# Patient Record
Sex: FEMALE | Race: WHITE | Employment: OTHER
[De-identification: names, ages, dates, MRNs, and addresses within clinical notes are randomized per-mention and may not be internally consistent; named-entity substitution may affect disease eponyms.]

---

## 2018-03-08 ENCOUNTER — RX ONLY (RX ONLY)
Age: 58
End: 2018-03-08

## 2018-03-08 ENCOUNTER — DOCTOR'S OFFICE (OUTPATIENT)
Dept: URBAN - METROPOLITAN AREA CLINIC 137 | Facility: CLINIC | Age: 58
Setting detail: OPHTHALMOLOGY
End: 2018-03-08
Payer: COMMERCIAL

## 2018-03-08 DIAGNOSIS — H52.4: ICD-10-CM

## 2018-03-08 PROBLEM — H40.013 GLAUCOMA SUSPECT OPEN ANGLE; BOTH EYES: Status: ACTIVE | Noted: 2018-03-08

## 2018-03-08 PROBLEM — H25.13 CATARACT NUCLEAR SCLEROSIS; BOTH EYES: Status: ACTIVE | Noted: 2018-03-08

## 2018-03-08 PROBLEM — H04.123 DRY EYE; BOTH EYES: Status: ACTIVE | Noted: 2018-03-08

## 2018-03-08 PROCEDURE — 92004 COMPRE OPH EXAM NEW PT 1/>: CPT | Performed by: OPHTHALMOLOGY

## 2018-03-08 ASSESSMENT — REFRACTION_MANIFEST
OS_VA1: 20/
OD_VA1: 20/
OD_VA1: 20/
OU_VA: 20/
OS_VA2: 20/
OU_VA: 20/
OD_VA2: 20/
OS_VA2: 20/
OD_VA3: 20/
OS_VA1: 20/
OD_VA3: 20/
OS_VA3: 20/
OS_VA3: 20/
OD_VA2: 20/

## 2018-03-08 ASSESSMENT — REFRACTION_AUTOREFRACTION
OD_AXIS: 008
OS_CYLINDER: +0.50
OD_CYLINDER: +1.75
OD_SPHERE: -0.50
OS_AXIS: 125
OS_SPHERE: +2.00

## 2018-03-08 ASSESSMENT — REFRACTION_CURRENTRX
OS_CYLINDER: +0.50
OD_ADD: +2.00
OS_OVR_VA: 20/
OS_OVR_VA: 20/
OD_SPHERE: -0.50
OD_OVR_VA: 20/
OS_AXIS: 166
OS_VPRISM_DIRECTION: PROGS
OS_ADD: +2.00
OD_VPRISM_DIRECTION: PROGS
OD_AXIS: 008
OS_SPHERE: +1.50
OD_CYLINDER: +1.00
OS_OVR_VA: 20/
OD_OVR_VA: 20/
OD_OVR_VA: 20/

## 2018-03-08 ASSESSMENT — SPHEQUIV_DERIVED
OD_SPHEQUIV: 0.375
OS_SPHEQUIV: 2.25

## 2018-03-08 ASSESSMENT — REFRACTION_OUTSIDERX
OS_ADD: +2.00
OS_CYLINDER: +0.50
OS_VA2: 20/20(J1+)
OS_SPHERE: +1.75
OD_VA1: 20/20-1
OS_AXIS: 170
OD_VA2: 20/20(J1+)
OS_VA1: 20/25
OD_AXIS: 010
OD_VA3: 20/
OD_ADD: +2.00
OS_VA3: 20/
OU_VA: 20/20
OD_CYLINDER: +1.25
OD_SPHERE: -0.50

## 2018-03-08 ASSESSMENT — CONFRONTATIONAL VISUAL FIELD TEST (CVF)
OD_FINDINGS: FULL
OS_FINDINGS: FULL

## 2018-03-08 ASSESSMENT — VISUAL ACUITY
OD_BCVA: 20/25-1
OS_BCVA: 20/25-2

## 2018-03-20 ENCOUNTER — DOCTOR'S OFFICE (OUTPATIENT)
Dept: URBAN - METROPOLITAN AREA CLINIC 137 | Facility: CLINIC | Age: 58
Setting detail: OPHTHALMOLOGY
End: 2018-03-20
Payer: MEDICARE

## 2018-03-20 DIAGNOSIS — H40.013: ICD-10-CM

## 2018-03-20 PROCEDURE — 92083 EXTENDED VISUAL FIELD XM: CPT | Performed by: OPHTHALMOLOGY

## 2018-04-04 ENCOUNTER — APPOINTMENT (EMERGENCY)
Dept: CT IMAGING | Facility: HOSPITAL | Age: 58
DRG: 918 | End: 2018-04-04
Payer: COMMERCIAL

## 2018-04-04 ENCOUNTER — APPOINTMENT (EMERGENCY)
Dept: RADIOLOGY | Facility: HOSPITAL | Age: 58
DRG: 918 | End: 2018-04-04
Payer: COMMERCIAL

## 2018-04-04 ENCOUNTER — HOSPITAL ENCOUNTER (INPATIENT)
Facility: HOSPITAL | Age: 58
LOS: 2 days | Discharge: HOME HEALTH | DRG: 918 | End: 2018-04-06
Attending: EMERGENCY MEDICINE | Admitting: FAMILY MEDICINE
Payer: COMMERCIAL

## 2018-04-04 DIAGNOSIS — R11.10 VOMITING: ICD-10-CM

## 2018-04-04 DIAGNOSIS — F32.A DEPRESSION: ICD-10-CM

## 2018-04-04 DIAGNOSIS — Q79.60 EHLERS-DANLOS DISEASE: ICD-10-CM

## 2018-04-04 DIAGNOSIS — T50.901A: Primary | ICD-10-CM

## 2018-04-04 DIAGNOSIS — R47.81 SLURRED SPEECH: ICD-10-CM

## 2018-04-04 PROBLEM — R29.810 FACIAL DROOP: Status: ACTIVE | Noted: 2018-04-04

## 2018-04-04 PROBLEM — R73.03 PREDIABETES: Status: ACTIVE | Noted: 2018-04-04

## 2018-04-04 PROBLEM — I10 HYPERTENSION: Status: ACTIVE | Noted: 2018-04-04

## 2018-04-04 PROBLEM — E78.5 HYPERLIPIDEMIA: Status: ACTIVE | Noted: 2018-04-04

## 2018-04-04 LAB
ALBUMIN SERPL BCP-MCNC: 4 G/DL (ref 3.5–5)
ALP SERPL-CCNC: 98 U/L (ref 46–116)
ALT SERPL W P-5'-P-CCNC: 29 U/L (ref 12–78)
AMPHETAMINES SERPL QL SCN: NEGATIVE
ANION GAP SERPL CALCULATED.3IONS-SCNC: 8 MMOL/L (ref 4–13)
APAP SERPL-MCNC: <2 UG/ML (ref 10–30)
APTT PPP: 31 SECONDS (ref 23–35)
AST SERPL W P-5'-P-CCNC: 16 U/L (ref 5–45)
BACTERIA UR QL AUTO: ABNORMAL /HPF
BARBITURATES UR QL: NEGATIVE
BASOPHILS # BLD AUTO: 0.03 THOUSANDS/ΜL (ref 0–0.1)
BASOPHILS NFR BLD AUTO: 0 % (ref 0–1)
BENZODIAZ UR QL: NEGATIVE
BILIRUB SERPL-MCNC: 0.4 MG/DL (ref 0.2–1)
BILIRUB UR QL STRIP: NEGATIVE
BUN SERPL-MCNC: 20 MG/DL (ref 5–25)
CALCIUM SERPL-MCNC: 8.9 MG/DL (ref 8.3–10.1)
CHLORIDE SERPL-SCNC: 102 MMOL/L (ref 100–108)
CLARITY UR: CLEAR
CO2 SERPL-SCNC: 30 MMOL/L (ref 21–32)
COCAINE UR QL: NEGATIVE
COLOR UR: YELLOW
CREAT SERPL-MCNC: 0.55 MG/DL (ref 0.6–1.3)
EOSINOPHIL # BLD AUTO: 0.01 THOUSAND/ΜL (ref 0–0.61)
EOSINOPHIL NFR BLD AUTO: 0 % (ref 0–6)
ERYTHROCYTE [DISTWIDTH] IN BLOOD BY AUTOMATED COUNT: 13.6 % (ref 11.6–15.1)
ETHANOL SERPL-MCNC: <3 MG/DL (ref 0–3)
GFR SERPL CREATININE-BSD FRML MDRD: 106 ML/MIN/1.73SQ M
GLUCOSE SERPL-MCNC: 152 MG/DL (ref 65–140)
GLUCOSE UR STRIP-MCNC: NEGATIVE MG/DL
HCT VFR BLD AUTO: 42.7 % (ref 34.8–46.1)
HGB BLD-MCNC: 14.2 G/DL (ref 11.5–15.4)
HGB UR QL STRIP.AUTO: ABNORMAL
INR PPP: 0.84 (ref 0.86–1.16)
KETONES UR STRIP-MCNC: ABNORMAL MG/DL
LEUKOCYTE ESTERASE UR QL STRIP: NEGATIVE
LIPASE SERPL-CCNC: 131 U/L (ref 73–393)
LYMPHOCYTES # BLD AUTO: 1.34 THOUSANDS/ΜL (ref 0.6–4.47)
LYMPHOCYTES NFR BLD AUTO: 12 % (ref 14–44)
MCH RBC QN AUTO: 30.9 PG (ref 26.8–34.3)
MCHC RBC AUTO-ENTMCNC: 33.3 G/DL (ref 31.4–37.4)
MCV RBC AUTO: 93 FL (ref 82–98)
METHADONE UR QL: NEGATIVE
MONOCYTES # BLD AUTO: 0.47 THOUSAND/ΜL (ref 0.17–1.22)
MONOCYTES NFR BLD AUTO: 4 % (ref 4–12)
NEUTROPHILS # BLD AUTO: 9.49 THOUSANDS/ΜL (ref 1.85–7.62)
NEUTS SEG NFR BLD AUTO: 84 % (ref 43–75)
NITRITE UR QL STRIP: NEGATIVE
NON-SQ EPI CELLS URNS QL MICRO: ABNORMAL /HPF
OPIATES UR QL SCN: NEGATIVE
PCP UR QL: NEGATIVE
PH UR STRIP.AUTO: 5.5 [PH] (ref 4.5–8)
PLATELET # BLD AUTO: 250 THOUSANDS/UL (ref 149–390)
PMV BLD AUTO: 10.1 FL (ref 8.9–12.7)
POTASSIUM SERPL-SCNC: 4 MMOL/L (ref 3.5–5.3)
PROT SERPL-MCNC: 7.6 G/DL (ref 6.4–8.2)
PROT UR STRIP-MCNC: NEGATIVE MG/DL
PROTHROMBIN TIME: 11.8 SECONDS (ref 12.1–14.4)
RBC # BLD AUTO: 4.59 MILLION/UL (ref 3.81–5.12)
RBC #/AREA URNS AUTO: ABNORMAL /HPF
SALICYLATES SERPL-MCNC: <3 MG/DL (ref 3–20)
SODIUM SERPL-SCNC: 140 MMOL/L (ref 136–145)
SP GR UR STRIP.AUTO: >=1.03 (ref 1–1.03)
THC UR QL: NEGATIVE
URATE CRY URNS QL MICRO: ABNORMAL /HPF
UROBILINOGEN UR QL STRIP.AUTO: 0.2 E.U./DL
WBC # BLD AUTO: 11.34 THOUSAND/UL (ref 4.31–10.16)
WBC #/AREA URNS AUTO: ABNORMAL /HPF

## 2018-04-04 PROCEDURE — 80307 DRUG TEST PRSMV CHEM ANLYZR: CPT | Performed by: EMERGENCY MEDICINE

## 2018-04-04 PROCEDURE — 99285 EMERGENCY DEPT VISIT HI MDM: CPT

## 2018-04-04 PROCEDURE — 93005 ELECTROCARDIOGRAM TRACING: CPT

## 2018-04-04 PROCEDURE — G0480 DRUG TEST DEF 1-7 CLASSES: HCPCS | Performed by: EMERGENCY MEDICINE

## 2018-04-04 PROCEDURE — 85730 THROMBOPLASTIN TIME PARTIAL: CPT | Performed by: EMERGENCY MEDICINE

## 2018-04-04 PROCEDURE — 99222 1ST HOSP IP/OBS MODERATE 55: CPT | Performed by: FAMILY MEDICINE

## 2018-04-04 PROCEDURE — 83690 ASSAY OF LIPASE: CPT | Performed by: EMERGENCY MEDICINE

## 2018-04-04 PROCEDURE — 96374 THER/PROPH/DIAG INJ IV PUSH: CPT

## 2018-04-04 PROCEDURE — 81001 URINALYSIS AUTO W/SCOPE: CPT

## 2018-04-04 PROCEDURE — 36415 COLL VENOUS BLD VENIPUNCTURE: CPT | Performed by: EMERGENCY MEDICINE

## 2018-04-04 PROCEDURE — 80329 ANALGESICS NON-OPIOID 1 OR 2: CPT | Performed by: EMERGENCY MEDICINE

## 2018-04-04 PROCEDURE — 71046 X-RAY EXAM CHEST 2 VIEWS: CPT

## 2018-04-04 PROCEDURE — 85025 COMPLETE CBC W/AUTO DIFF WBC: CPT | Performed by: EMERGENCY MEDICINE

## 2018-04-04 PROCEDURE — 70450 CT HEAD/BRAIN W/O DYE: CPT

## 2018-04-04 PROCEDURE — 80320 DRUG SCREEN QUANTALCOHOLS: CPT | Performed by: EMERGENCY MEDICINE

## 2018-04-04 PROCEDURE — 80053 COMPREHEN METABOLIC PANEL: CPT | Performed by: EMERGENCY MEDICINE

## 2018-04-04 PROCEDURE — 85610 PROTHROMBIN TIME: CPT | Performed by: EMERGENCY MEDICINE

## 2018-04-04 PROCEDURE — 96361 HYDRATE IV INFUSION ADD-ON: CPT

## 2018-04-04 RX ORDER — SODIUM CHLORIDE 9 MG/ML
125 INJECTION, SOLUTION INTRAVENOUS CONTINUOUS
Status: DISCONTINUED | OUTPATIENT
Start: 2018-04-04 | End: 2018-04-06 | Stop reason: HOSPADM

## 2018-04-04 RX ORDER — DULOXETIN HYDROCHLORIDE 60 MG/1
20 CAPSULE, DELAYED RELEASE ORAL DAILY
COMMUNITY
End: 2018-04-06 | Stop reason: HOSPADM

## 2018-04-04 RX ORDER — OXCARBAZEPINE 150 MG/1
150 TABLET, FILM COATED ORAL 2 TIMES DAILY
COMMUNITY

## 2018-04-04 RX ORDER — NYSTATIN 100000 [USP'U]/G
POWDER TOPICAL 2 TIMES DAILY
COMMUNITY
Start: 2018-01-04

## 2018-04-04 RX ORDER — MAGNESIUM HYDROXIDE/ALUMINUM HYDROXICE/SIMETHICONE 120; 1200; 1200 MG/30ML; MG/30ML; MG/30ML
15 SUSPENSION ORAL EVERY 6 HOURS PRN
Status: DISCONTINUED | OUTPATIENT
Start: 2018-04-04 | End: 2018-04-06 | Stop reason: HOSPADM

## 2018-04-04 RX ORDER — ONDANSETRON 2 MG/ML
4 INJECTION INTRAMUSCULAR; INTRAVENOUS ONCE
Status: COMPLETED | OUTPATIENT
Start: 2018-04-04 | End: 2018-04-04

## 2018-04-04 RX ORDER — CEPHALEXIN 250 MG/1
500 CAPSULE ORAL EVERY 8 HOURS SCHEDULED
Status: ON HOLD | COMMUNITY
End: 2018-06-30

## 2018-04-04 RX ORDER — PRAVASTATIN SODIUM 40 MG
40 TABLET ORAL EVERY OTHER DAY
COMMUNITY

## 2018-04-04 RX ORDER — ONDANSETRON 2 MG/ML
4 INJECTION INTRAMUSCULAR; INTRAVENOUS ONCE
Status: DISCONTINUED | OUTPATIENT
Start: 2018-04-04 | End: 2018-04-06 | Stop reason: HOSPADM

## 2018-04-04 RX ORDER — ONDANSETRON 2 MG/ML
4 INJECTION INTRAMUSCULAR; INTRAVENOUS EVERY 6 HOURS PRN
Status: DISCONTINUED | OUTPATIENT
Start: 2018-04-04 | End: 2018-04-06 | Stop reason: HOSPADM

## 2018-04-04 RX ORDER — POLYETHYLENE GLYCOL 3350 17 G/17G
17 POWDER, FOR SOLUTION ORAL DAILY PRN
Status: DISCONTINUED | OUTPATIENT
Start: 2018-04-04 | End: 2018-04-06 | Stop reason: HOSPADM

## 2018-04-04 RX ORDER — PRAVASTATIN SODIUM 40 MG
40 TABLET ORAL EVERY OTHER DAY
Status: DISCONTINUED | OUTPATIENT
Start: 2018-04-05 | End: 2018-04-06 | Stop reason: HOSPADM

## 2018-04-04 RX ORDER — OXCARBAZEPINE 150 MG/1
150 TABLET, FILM COATED ORAL 2 TIMES DAILY
Status: DISCONTINUED | OUTPATIENT
Start: 2018-04-05 | End: 2018-04-06 | Stop reason: HOSPADM

## 2018-04-04 RX ORDER — ACETAMINOPHEN 325 MG/1
650 TABLET ORAL EVERY 6 HOURS PRN
Status: DISCONTINUED | OUTPATIENT
Start: 2018-04-04 | End: 2018-04-06 | Stop reason: HOSPADM

## 2018-04-04 RX ORDER — DULOXETIN HYDROCHLORIDE 20 MG/1
20 CAPSULE, DELAYED RELEASE ORAL DAILY
Status: DISCONTINUED | OUTPATIENT
Start: 2018-04-05 | End: 2018-04-06 | Stop reason: HOSPADM

## 2018-04-04 RX ORDER — NYSTATIN 100000 [USP'U]/G
POWDER TOPICAL 2 TIMES DAILY
Status: DISCONTINUED | OUTPATIENT
Start: 2018-04-05 | End: 2018-04-06 | Stop reason: HOSPADM

## 2018-04-04 RX ADMIN — ONDANSETRON 4 MG: 2 INJECTION INTRAMUSCULAR; INTRAVENOUS at 19:52

## 2018-04-04 RX ADMIN — SODIUM CHLORIDE 1000 ML: 0.9 INJECTION, SOLUTION INTRAVENOUS at 19:42

## 2018-04-04 RX ADMIN — SODIUM CHLORIDE 1000 ML: 0.9 INJECTION, SOLUTION INTRAVENOUS at 23:07

## 2018-04-04 NOTE — ED PROVIDER NOTES
History  Chief Complaint   Patient presents with    CVA/TIA-like Symptoms     from home via EMS with CVA symptoms     72-year-old female presents to the emergency department via EMS from home  EMS provides history that her  had spoken with her by phone at 12:30  Her speech was clear at that time and she was complaining of a severe headache which was not typical for her  He came home this evening and found her with slurred speech and left-sided weakness  EMS appreciated weakness on left hand    reported the patient has a number of physical illness at baseline which would prevent her from following some commands  Glucose was within normal limits  Blood pressure was 90/60  Oxygen saturation was 91% on room air and improved upon application of 2 L nasal cannula  Patient denies having any pain at this time  Denies having any shortness of breath  Prior records briefly reviewed  Patient with Hereditary and idiopathic peripheral neuropathy, prediabetes, hypertension, Guillermo-Danlos and constipation  Spoke with  the at 25 39  He relates that he had spoken with his wife at about 15 31  She indicated that she had bad headache and was going to lay on the couch  Her speech sounded clear  She had additionally mentioned having received the result of a CBC which were unremarkable  He provides history that approximately 10 months ago she was hospitalized with anemia  She had was additionally found to have a urinary tract infection at that time which was felt to have contributed to the anemia  He notes that a small amount of blood wears in her urine then  During hospitalization she had delirium of unclear cause  He notes that she was put on a number of psychiatric medications though has been off of these now for several months  He shares past medical history of neuropathy and 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    She additionally has a history of spinal stenosis and underwent surgery for this greater than a year ago  She does not have a history of headaches and has not had any TIAs/CVAs  He has not appreciated her with any slurred speech previously  Daughter notes that over the past few days she has mention feeling tired  She does have insomnia and has not been sleeping well   brought bottles of medications including cephalexin which was prescribed   He was uncertain as to the indication for this medication or whether she was actively taking it  To of 21 tablets remained  He notes that the patient's mother had a stroke and a problem with her carotid artery  Details of this are unknown  Patient sees multiple providers at [facility]             Prior to Admission Medications   Prescriptions Last Dose Informant Patient Reported? Taking? DULoxetine (CYMBALTA) 60 mg delayed release capsule   Yes Yes   Sig: Take 20 mg by mouth daily   OXcarbazepine (TRILEPTAL) 150 mg tablet   Yes Yes   Sig: Take 150 mg by mouth 2 (two) times a day   Plecanatide 3 MG TABS   Yes Yes   Sig: Take 3 mg by mouth daily   cephalexin (KEFLEX) 250 mg capsule   Yes Yes   Sig: Take 500 mg by mouth every 8 (eight) hours   nystatin (MYCOSTATIN) powder   Yes Yes   Sig: Apply topically 2 (two) times a day   pravastatin (PRAVACHOL) 40 mg tablet   Yes Yes   Sig: Take 40 mg by mouth every other day      Facility-Administered Medications: None       History reviewed  No pertinent past medical history  Past Surgical History:   Procedure Laterality Date     SECTION      x 2    X-STOP IMPLANTATION      2017       History reviewed  No pertinent family history  I have reviewed and agree with the history as documented      Social History   Substance Use Topics    Smoking status: Former Smoker    Smokeless tobacco: Never Used    Alcohol use Yes      Comment: social        Review of Systems   All other systems reviewed and are negative  Physical Exam  ED Triage Vitals [04/04/18 1924]   Temperature Pulse Respirations Blood Pressure SpO2   98 2 °F (36 8 °C) 93 20 138/84 98 %      Temp Source Heart Rate Source Patient Position - Orthostatic VS BP Location FiO2 (%)   Oral Monitor Sitting Left arm --      Pain Score       No Pain           Orthostatic Vital Signs  Vitals:    04/04/18 1924 04/04/18 2041 04/04/18 2137 04/04/18 2233   BP: 138/84 138/84 127/67 138/78   Pulse: 93 81 83 88   Patient Position - Orthostatic VS: Sitting Lying  Lying       Physical Exam   Constitutional: She appears well-developed and well-nourished  Initially anxious appearing  Subsequently vomited a large amount of yellow food content material   HENT:   Head: Normocephalic and atraumatic  Eyes: Conjunctivae and EOM are normal  Pupils are equal, round, and reactive to light  Scab appreciated over the center of the right upper eyelid   Cardiovascular: Normal rate and regular rhythm  Pulmonary/Chest: Effort normal and breath sounds normal  No respiratory distress  Abdominal: Soft  She exhibits no distension  There is no tenderness  Musculoskeletal:   Patient with lower legs in braces  Neurological: She is alert  No facial asymmetry appreciated on examination  Speech is a bit dysarthric  Pupils briskly reactive to light  EOMI  No nystagmus  Strong eye closure & symmetric brow raise  Ability to insufflate cheeks, protrude tongue midline & range this laterally  Symmetric/ intact sensation in the upper, mid & lower portions of the face  5/5 strength on head turn, shoulder shrug, bicep, tricep & deltoid movement against resistance b/l   5/5 strength on b/l hand   Raises hips reasonably well off stretcher keeping knees bent  Skin: Skin is warm and dry  Psychiatric: Her behavior is normal    Nursing note and vitals reviewed        ED Medications  Medications   ondansetron (ZOFRAN) injection 4 mg (0 mg Intravenous Hold 4/4/18 2130)   sodium chloride 0 9 % infusion (125 mL/hr Intravenous New Bag 4/5/18 0034)   DULoxetine (CYMBALTA) delayed release capsule 20 mg (not administered)   nystatin (MYCOSTATIN) powder (not administered)   OXcarbazepine (TRILEPTAL) tablet 150 mg (not administered)   Plecanatide TABS 3 mg (not administered)   pravastatin (PRAVACHOL) tablet 40 mg (not administered)   sodium chloride 0 9 % bolus 1,000 mL (1,000 mL Intravenous New Bag 4/4/18 2307)   polyethylene glycol (MIRALAX) packet 17 g (not administered)   enoxaparin (LOVENOX) subcutaneous injection 40 mg (not administered)   acetaminophen (TYLENOL) tablet 650 mg (not administered)   ondansetron (ZOFRAN) injection 4 mg (not administered)   aluminum-magnesium hydroxide-simethicone (MYLANTA) 200-200-20 mg/5 mL oral suspension 15 mL (not administered)   sodium chloride 0 9 % bolus 1,000 mL (0 mL Intravenous Stopped 4/4/18 2136)   ondansetron (ZOFRAN) injection 4 mg (4 mg Intravenous Given 4/4/18 1952)       Diagnostic Studies  Results Reviewed     Procedure Component Value Units Date/Time    Ethanol [[id number]]  (Normal) Collected:  04/04/18 2136    Lab Status:  Final result Specimen:  Blood from Arm, Left Updated:  04/04/18 2204     Ethanol Lvl <3 mg/dL     Salicylate level [[id number]]  (Abnormal) Collected:  04/04/18 2136    Lab Status:  Final result Specimen:  Blood from Arm, Left Updated:  45/88/81 4216     Salicylate Lvl <3 (L) mg/dL     Acetaminophen level [[id number]]  (Abnormal) Collected:  04/04/18 2136    Lab Status:  Final result Specimen:  Blood from Arm, Left Updated:  04/04/18 2159     Acetaminophen Level <2 (L) ug/mL     Urine Microscopic [[id number]]  (Abnormal) Collected:  04/04/18 2102    Lab Status:  Final result Specimen:  Urine Updated:  04/04/18 2154     RBC, UA 4-10 (A) /hpf      WBC, UA 0-5 /hpf      Epithelial Cells Occasional /hpf      Bacteria, UA None Seen /hpf      Uric Acid Jackeline, UA Moderate /hpf     Rapid drug screen, urine [[id number]]  (Normal) Collected: 04/04/18 2057    Lab Status:  Final result Specimen:  Urine from Urine, Other Updated:  04/04/18 2140     Amph/Meth UR Negative     Barbiturate Ur Negative     Benzodiazepine Urine Negative     Cocaine Urine Negative     Methadone Urine Negative     Opiate Urine Negative     PCP Ur Negative     THC Urine Negative    Narrative:         FOR MEDICAL PURPOSES ONLY  IF CONFIRMATION NEEDED PLEASE CONTACT THE LAB WITHIN 5 DAYS      Drug Screen Cutoff Levels:  AMPHETAMINE/METHAMPHETAMINES  1000 ng/mL  BARBITURATES     200 ng/mL  BENZODIAZEPINES     200 ng/mL  COCAINE      300 ng/mL  METHADONE      300 ng/mL  OPIATES      300 ng/mL  PHENCYCLIDINE     25 ng/mL  THC       50 ng/mL    ED Urine Macroscopic [[id number]]  (Abnormal) Collected:  04/04/18 2102    Lab Status:  Final result Specimen:  Urine Updated:  04/04/18 2102     Color, UA Yellow     Clarity, UA Clear     pH, UA 5 5     Leukocytes, UA Negative     Nitrite, UA Negative     Protein, UA Negative mg/dl      Glucose, UA Negative mg/dl      Ketones, UA Trace (A) mg/dl      Urobilinogen, UA 0 2 E U /dl      Bilirubin, UA Negative     Blood, UA Large (A)     Specific Gravity, UA >=1 030    Narrative:       CLINITEK RESULT    Comprehensive metabolic panel [[id number]]  (Abnormal) Collected:  04/04/18 1943    Lab Status:  Final result Specimen:  Blood from Arm, Left Updated:  04/04/18 2008     Sodium 140 mmol/L      Potassium 4 0 mmol/L      Chloride 102 mmol/L      CO2 30 mmol/L      Anion Gap 8 mmol/L      BUN 20 mg/dL      Creatinine 0 55 (L) mg/dL      Glucose 152 (H) mg/dL      Calcium 8 9 mg/dL      AST 16 U/L      ALT 29 U/L      Alkaline Phosphatase 98 U/L      Total Protein 7 6 g/dL      Albumin 4 0 g/dL      Total Bilirubin 0 40 mg/dL      eGFR 106 ml/min/1 73sq m     Narrative:         National Kidney Disease Education Program recommendations are as follows:  GFR calculation is accurate only with a steady state creatinine  Chronic Kidney disease less than 60 ml/min/1 73 sq  meters  Kidney failure less than 15 ml/min/1 73 sq  meters  Lipase [[id number]]  (Normal) Collected:  04/04/18 1943    Lab Status:  Final result Specimen:  Blood from Arm, Left Updated:  04/04/18 2008     Lipase 131 u/L     Protime-INR [[id number]]  (Abnormal) Collected:  04/04/18 1943    Lab Status:  Final result Specimen:  Blood from Arm, Left Updated:  04/04/18 2004     Protime 11 8 (L) seconds      INR 0 84 (L)    APTT [[id number]]  (Normal) Collected:  04/04/18 1943    Lab Status:  Final result Specimen:  Blood from Arm, Left Updated:  04/04/18 2004     PTT 31 seconds     Narrative: Therapeutic Heparin Range = 60-90 seconds    CBC and differential [[id number]]  (Abnormal) Collected:  04/04/18 1943    Lab Status:  Final result Specimen:  Blood from Arm, Left Updated:  04/04/18 1952     WBC 11 34 (H) Thousand/uL      RBC 4 59 Million/uL      Hemoglobin 14 2 g/dL      Hematocrit 42 7 %      MCV 93 fL      MCH 30 9 pg      MCHC 33 3 g/dL      RDW 13 6 %      MPV 10 1 fL      Platelets 390 Thousands/uL      Neutrophils Relative 84 (H) %      Lymphocytes Relative 12 (L) %      Monocytes Relative 4 %      Eosinophils Relative 0 %      Basophils Relative 0 %      Neutrophils Absolute 9 49 (H) Thousands/µL      Lymphocytes Absolute 1 34 Thousands/µL      Monocytes Absolute 0 47 Thousand/µL      Eosinophils Absolute 0 01 Thousand/µL      Basophils Absolute 0 03 Thousands/µL                  XR chest 2 views   Final Result by Miguel Palmer MD (04/04 2131)      No active pulmonary disease  Workstation performed: [id number]         CT head without contrast   Final Result by Cesar Munguia MD (04/04 2028)      No acute intracranial abnormality                    Workstation performed: [id number]                    Procedures  ECG 12 Lead Documentation  Date/Time: 4/4/2018 7:35 PM  Performed by: Karrie Collado  Authorized by: Karrie Collado     ECG reviewed by me, the ED Provider: yes    Patient location:  ED  Previous ECG:     Previous ECG:  Compared to current    Similarity:  No change  Interpretation:     Interpretation: normal    Rate:     ECG rate:  85    ECG rate assessment: normal    Rhythm:     Rhythm: sinus rhythm    Ectopy:     Ectopy: none    QRS:     QRS axis:  Left           Phone Contacts  ED Phone Contact    ED Course  ED Course as of Apr 05 0101 Wed Apr 04, 2018 2118 Patient shared with her  any passed along to nursing staff that she had taken extra of her oxcarbazepine today  Patient admits to having taken this at approximately 1300  She estimates that she took 3/4 of a bottle    And later related that she believe she took 60 tabs    She relates that there had been 90 in the bottle  When questioned what had been going through her mind when she took these she relates I was depressed    When asked what she was hoping the medication would do she relates ease my depression    I asked if she was trying to end her life in she stated I was depressed   Her  then began speaking up about how the Cymbalta was in fact for her depression and that this medication was for leg pain  She then began explaining that she was having a lot of leg pain  He stated you weren't trying to end your life  - you were just trying to help the leg pain   She agreed with this assessment and did not speak further on this  Both denied that she had ever over taken medication previously  Patient relates that she feels [place]    She does have very mild nausea  Abdomen soft and nontender                                  MDM  Number of Diagnoses or Management Options  Depression:   Overdose of medication:   Slurred speech:   Vomiting:     CritCare Time    Disposition  Final diagnoses:   Overdose of medication   Depression   Slurred speech   Vomiting     Time reflects when diagnosis was documented in both MDM as applicable and the Disposition within this note     Time User Action Codes Description Comment    4/4/2018  9:26 PM Viji RANDOLPH Add [T50 901A] Overdose of medication     4/4/2018  9:26 PM Viji RANDOLPH Add [F32 9] Depression     4/4/2018  9:26 PM Viji RANDOLPH Add [R47 81] Slurred speech     4/4/2018  9:26 PM Viji RANDOLPH Add [R11 10] Vomiting       ED Disposition     ED Disposition Condition Comment    Admit  Case was discussed with  ESCOBAR Rebolledo and the patient's admission status was agreed to be inpatient, telemetry to the service of Dr Radhames Ca   Follow-up Information    None       Current Discharge Medication List      CONTINUE these medications which have NOT CHANGED    Details   cephalexin (KEFLEX) 250 mg capsule Take 500 mg by mouth every 8 (eight) hours      DULoxetine (CYMBALTA) 60 mg delayed release capsule Take 20 mg by mouth daily      nystatin (MYCOSTATIN) powder Apply topically 2 (two) times a day      OXcarbazepine (TRILEPTAL) 150 mg tablet Take 150 mg by mouth 2 (two) times a day      Plecanatide 3 MG TABS Take 3 mg by mouth daily      pravastatin (PRAVACHOL) 40 mg tablet Take 40 mg by mouth every other day           No discharge procedures on file      ED Provider  Electronically Signed by           Adelina Ronquillo MD  04/05/18 6985

## 2018-04-05 LAB
ANION GAP SERPL CALCULATED.3IONS-SCNC: 9 MMOL/L (ref 4–13)
ARTERIAL PATENCY WRIST A: ABNORMAL
ATRIAL RATE: 89 BPM
BASE EXCESS BLDA CALC-SCNC: 2 MMOL/L (ref -2–3)
BUN SERPL-MCNC: 15 MG/DL (ref 5–25)
CA-I BLD-SCNC: 1.19 MMOL/L (ref 1.12–1.32)
CALCIUM SERPL-MCNC: 8.4 MG/DL (ref 8.3–10.1)
CHLORIDE SERPL-SCNC: 107 MMOL/L (ref 100–108)
CO2 SERPL-SCNC: 28 MMOL/L (ref 21–32)
CREAT SERPL-MCNC: 0.44 MG/DL (ref 0.6–1.3)
DS:DELIVERY SYSTEM: ABNORMAL
FIO2 GAS DIL.REBREATH: 28 L
GFR SERPL CREATININE-BSD FRML MDRD: 114 ML/MIN/1.73SQ M
GLUCOSE SERPL-MCNC: 112 MG/DL (ref 65–140)
GLUCOSE SERPL-MCNC: 166 MG/DL (ref 65–140)
HCO3 BLDA-SCNC: 27.2 MMOL/L (ref 22–28)
HCT VFR BLD CALC: 36 % (ref 34.8–46.1)
HGB BLDA-MCNC: 12.2 G/DL (ref 11.5–15.4)
P AXIS: 64 DEGREES
PCO2 BLD: 29 MMOL/L (ref 21–32)
PCO2 BLD: 46.1 MM HG (ref 36–44)
PH BLD: 7.38 [PH] (ref 7.35–7.45)
PO2 BLD: 98 MM HG (ref 75–129)
POTASSIUM BLD-SCNC: 3.7 MMOL/L (ref 3.5–5.3)
POTASSIUM SERPL-SCNC: 3.8 MMOL/L (ref 3.5–5.3)
PR INTERVAL: 156 MS
QRS AXIS: -39 DEGREES
QRSD INTERVAL: 100 MS
QT INTERVAL: 372 MS
QTC INTERVAL: 443 MS
SAMPLE SITE: ABNORMAL
SAO2 % BLD FROM PO2: 97 % (ref 95–98)
SODIUM BLD-SCNC: 140 MMOL/L (ref 136–145)
SODIUM SERPL-SCNC: 144 MMOL/L (ref 136–145)
SPECIMEN SOURCE: ABNORMAL
T WAVE AXIS: 46 DEGREES
VENTRICULAR RATE: 85 BPM

## 2018-04-05 PROCEDURE — 84132 ASSAY OF SERUM POTASSIUM: CPT

## 2018-04-05 PROCEDURE — 85014 HEMATOCRIT: CPT

## 2018-04-05 PROCEDURE — 92610 EVALUATE SWALLOWING FUNCTION: CPT

## 2018-04-05 PROCEDURE — 36600 WITHDRAWAL OF ARTERIAL BLOOD: CPT

## 2018-04-05 PROCEDURE — 80048 BASIC METABOLIC PNL TOTAL CA: CPT | Performed by: PHYSICIAN ASSISTANT

## 2018-04-05 PROCEDURE — 82947 ASSAY GLUCOSE BLOOD QUANT: CPT

## 2018-04-05 PROCEDURE — 99223 1ST HOSP IP/OBS HIGH 75: CPT | Performed by: PSYCHIATRY & NEUROLOGY

## 2018-04-05 PROCEDURE — 84295 ASSAY OF SERUM SODIUM: CPT

## 2018-04-05 PROCEDURE — 93010 ELECTROCARDIOGRAM REPORT: CPT | Performed by: INTERNAL MEDICINE

## 2018-04-05 PROCEDURE — 82803 BLOOD GASES ANY COMBINATION: CPT

## 2018-04-05 PROCEDURE — 82330 ASSAY OF CALCIUM: CPT

## 2018-04-05 PROCEDURE — 80183 DRUG SCRN QUANT OXCARBAZEPIN: CPT | Performed by: PHYSICIAN ASSISTANT

## 2018-04-05 RX ADMIN — SODIUM CHLORIDE 125 ML/HR: 0.9 INJECTION, SOLUTION INTRAVENOUS at 10:05

## 2018-04-05 RX ADMIN — NYSTATIN: 100000 POWDER TOPICAL at 10:06

## 2018-04-05 RX ADMIN — SODIUM CHLORIDE 125 ML/HR: 0.9 INJECTION, SOLUTION INTRAVENOUS at 00:34

## 2018-04-05 RX ADMIN — ENOXAPARIN SODIUM 40 MG: 40 INJECTION SUBCUTANEOUS at 10:11

## 2018-04-05 RX ADMIN — NYSTATIN: 100000 POWDER TOPICAL at 18:09

## 2018-04-05 RX ADMIN — SODIUM CHLORIDE 125 ML/HR: 0.9 INJECTION, SOLUTION INTRAVENOUS at 18:11

## 2018-04-05 NOTE — H&P
H&P- Coco Farrar 1960, 56 y o  female MRN: [id number]    Unit/Bed#: -01 Encounter: [id number]    Primary Care Provider: Maryclare Claude, DO   Date and time admitted to hospital: 4/4/2018  7:16 PM  * Overdose   Assessment & Plan    1  Pt took sixty 150 mg tablets of Trileptal   -currently denying SI, admits to depression   2  Vomiting and hypotensive prior to arrival    -resolving, most recent BP's in 890'J systolic   3  CT head negative for hemorrhage, CXR without signs of aspiration   4  Electrolytes wnl on arrival   5  Monitor on telemetry  6  Will check Trileptal level   7  Q4 Neuro checks   8  Supportive care with anti-emetics, IVF   -monitor BP, monitor electrolytes  9  Psych consult   -Pt will require 1:1        Guillermo-Danlos disease   Assessment & Plan    1  Will hold off on Trileptal for neuropathy for now, can continue tomorrow if pt is still alert and asymptomatic  2  Managed by PCP  3  Pt uses scooter and walker  Hyperlipidemia   Assessment & Plan    1  Continue statin  Prediabetes   Assessment & Plan    1  Carb-controlled diet  VTE Prophylaxis: Enoxaparin (Lovenox)  / sequential compression device   Code Status: FULL  POLST: POLST form is not discussed and not completed at this time  Discussion with family:  and two children at bedside    Anticipated Length of Stay:  Patient will be admitted on an Inpatient basis with an anticipated length of stay of  > 2 midnights  Justification for Hospital Stay:  Requiring telemetry monitoring and psych eval     Total Time for Visit, including Counseling / Coordination of Care: 30 minutes  Greater than 50% of this total time spent on direct patient counseling and coordination of care  Chief Complaint:   Altered mental status     History of Present Illness:    Coco Farrar is a 56 y o  female with PMHx of SUSAN Richardson, who presents with altered mental status and vomiting   Patient states that she was having really bad leg pain so she took about "three-quarters" of the bottle of her Trileptal meds for neuropathy  Pt says the bottle was not "full" however  She says usually there are 90 pills per bottle and she thinks maybe there were 85 pills total in the bottle form which she consumed 3/4ths  Pt says she does not remember much after that  She does remember vomiting  She never experienced shortness of breath or chest pain  She has minimal control of bowel and bladder function at baseline  She feels generally fatigued and weak but denies any focal weakness or focal numbness  She has baseline numbness of her lower extremities and says that this is unchanged  She denies change in vision and her only headache was a tension headache earlier in the day  Denies facial droop  Pt denies any hematuria or bloody or dark stools as pt had issues with anemia during last admission 2/2 hematuria  She denies abdominal pain or diarrhea   does think she may have coughed a couple times while vomiting  Asked family to step out as  would answer for  His wife often, specifically when I asked why she took so many pills  He insisted that she was just confused and must have been having very bad leg pain  Family was right outside the door when I asked again why she took so many pills  Pt still claimed that she just had worsening leg pain and that she thought this would help her legs  She denies any intention to hurt herself  She says that her Cymbalta is managing her depression well but does admit to feeling stressed at home  She says her disease can be difficult to manage and that she misses having her children at home who are not moved out of the house  Pt is agreeable to speaking with Psych  Review of Systems:    Review of Systems   Constitutional: Negative  HENT: Negative  Eyes: Negative  Respiratory: Negative  Cardiovascular: Negative  Gastrointestinal: Positive for vomiting   Negative for abdominal distention, abdominal pain, blood in stool and diarrhea  Endocrine: Negative  Genitourinary: Negative  Musculoskeletal: Positive for myalgias  Neurological: Positive for numbness and headaches  Negative for dizziness, tremors, seizures, syncope, facial asymmetry, speech difficulty, weakness and light-headedness  Hematological: Negative  Psychiatric/Behavioral: Negative  Past Medical and Surgical History:     History reviewed  No pertinent past medical history  Past Surgical History:   Procedure Laterality Date     SECTION      x 2    X-STOP IMPLANTATION      2017       Meds/Allergies:    Prior to Admission medications    Medication Sig Start Date End Date Taking? Authorizing Provider   cephalexin (KEFLEX) 250 mg capsule Take 500 mg by mouth every 8 (eight) hours   Yes Historical Provider, MD   DULoxetine (CYMBALTA) 60 mg delayed release capsule Take 20 mg by mouth daily   Yes Historical Provider, MD   nystatin (MYCOSTATIN) powder Apply topically 2 (two) times a day 18  Yes Historical Provider, MD   OXcarbazepine (TRILEPTAL) 150 mg tablet Take 150 mg by mouth 2 (two) times a day   Yes Historical Provider, MD   Plecanatide 3 MG TABS Take 3 mg by mouth daily 18  Yes Historical Provider, MD   pravastatin (PRAVACHOL) 40 mg tablet Take 40 mg by mouth every other day   Yes Historical Provider, MD     I have reviewed home medications with patient personally  Allergies:    Allergies   Allergen Reactions    Ciprofloxacin        Social History:     Marital Status: /Civil Union   Occupation: unemployed  Patient Pre-hospital Living Situation: home with   Patient Pre-hospital Level of Mobility: scooter mostly, walker occasionally  Patient Pre-hospital Diet Restrictions: none  Substance Use History:   History   Alcohol Use    Yes     Comment: social     History   Smoking Status    Former Smoker   Smokeless Tobacco    Never Used     History   Drug use: Unknown Family History:    non-contributory    Physical Exam:     Vitals:   Blood Pressure: 138/78 (04/04/18 2233)  Pulse: 88 (04/04/18 2233)  Temperature: 98 3 °F (36 8 °C) (04/04/18 2233)  Temp Source: Oral (04/04/18 2233)  Respirations: 16 (04/04/18 2233)  Height: 5' 6" (167 6 cm) (04/04/18 2233)  Weight - Scale: 95 4 kg (210 lb 5 1 oz) (04/04/18 2233)  SpO2: 93 % (04/04/18 2233)    Physical Exam    Additional Data:     Lab Results: I have personally reviewed pertinent reports  Results from last 7 days  Lab Units 04/04/18 1943   WBC Thousand/uL 11 34*   HEMOGLOBIN g/dL 14 2   HEMATOCRIT % 42 7   PLATELETS Thousands/uL 250   NEUTROS PCT % 84*   LYMPHS PCT % 12*   MONOS PCT % 4   EOS PCT % 0       Results from last 7 days  Lab Units 04/04/18 1943   SODIUM mmol/L 140   POTASSIUM mmol/L 4 0   CHLORIDE mmol/L 102   CO2 mmol/L 30   BUN mg/dL 20   CREATININE mg/dL 0 55*   CALCIUM mg/dL 8 9   TOTAL PROTEIN g/dL 7 6   BILIRUBIN TOTAL mg/dL 0 40   ALK PHOS U/L 98   ALT U/L 29   AST U/L 16   GLUCOSE RANDOM mg/dL 152*       Results from last 7 days  Lab Units 04/04/18 1943   INR  0 84*       Imaging: I have personally reviewed pertinent reports  XR chest 2 views   Final Result by Yani Choi MD (04/04 2131)      No active pulmonary disease  Workstation performed: [id number]         CT head without contrast   Final Result by Douglas John MD (04/04 2028)      No acute intracranial abnormality  Workstation performed: [id number]             EKG, Pathology, and Other Studies Reviewed on Admission:   · EKG: NSR, 85 BPM    Allscripts / Epic Records Reviewed: No     ** Please Note: This note has been constructed using a voice recognition system   **

## 2018-04-05 NOTE — ED NOTES
Pt states she took extra Trileptal today  Pt unsure how many pills  States she took them to relieve her pain, denies SI  Trileptal bottle filled 2/18/18  Pt states she takes 3 per day  19 pills left in bottle tonight  Pt first states she took 5 today, but then states she took 36  Pt  unsure        Mary Lou Rogers, ELOISA  04/04/18 1103
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
## 2018-04-05 NOTE — PROGRESS NOTES
Told by nurse that patient with change in mental status  Pt is with eyes closed and is tossing and turning in bed, will occasionally turn on her side and  the side rails  Pt will open her eyes to tactile stimuli but will not respond verbally to any questions  Pupils are reactive to light  CC AP available for discussion and evaluated patient  Pt did stick tongue when asked by CC AP and also squeezed hands bilaterally when advised  No focal deficits  Will check an ABG, continue neuro checks Q4  Suspect a sleep movement disorder however if patient is not alert by morning, will consult Neurology

## 2018-04-05 NOTE — CASE MANAGEMENT
Initial Clinical Review    Admission: Date/Time/Statement: 4/4/18 @ 2128     Orders Placed This Encounter   Procedures    Inpatient Admission (expected length of stay for this patient is greater than two midnights)     Standing Status:   Standing     Number of Occurrences:   1     Order Specific Question:   Admitting Physician     Answer:   Ludivina Duffy [[id number]]     Order Specific Question:   Level of Care     Answer:   Med Surg [16]     Order Specific Question:   Bed request comments     Answer:   Telemetry     Order Specific Question:   Estimated length of stay     Answer:   More than 2 Midnights     Order Specific Question:   Certification     Answer:   I certify that inpatient services are medically necessary for this patient for a duration of greater than two midnights  See H&P and MD Progress Notes for additional information about the patient's course of treatment  ED: Date/Time/Mode of Arrival:   ED Arrival Information     Expected Arrival Acuity Means of Arrival Escorted By Service Admission Type    - 4/4/2018 19:15 [facility]    Arrival Complaint    -          Chief Complaint:   Chief Complaint   Patient presents with    CVA/TIA-like Symptoms     from home via EMS with CVA symptoms       History of Illness: 56 y o  female with PMHx of SUSAN Oh, who presents with altered mental status and vomiting  Patient states that she was having really bad leg pain so she took about "three-quarters" of the bottle of her Trileptal meds for neuropathy  Pt says the bottle was not "full" however  She says usually there are 90 pills per bottle and she thinks maybe there were 85 pills total in the bottle form which she consumed 3/4ths  Pt says she does not remember much after that  She does remember vomiting  She never experienced shortness of breath or chest pain  She has minimal control of bowel and bladder function at baseline   She feels generally fatigued and weak but denies any focal weakness or focal numbness  She has baseline numbness of her lower extremities and says that this is unchanged  She denies change in vision and her only headache was a tension headache earlier in the day  Denies facial droop  Pt denies any hematuria or bloody or dark stools as pt had issues with anemia during last admission 2/2 hematuria  She denies abdominal pain or diarrhea   does think she may have coughed a couple times while vomiting       Asked family to step out as  would answer for  His wife often, specifically when I asked why she took so many pills  He insisted that she was just confused and must have been having very bad leg pain  Family was right outside the door when I asked again why she took so many pills  Pt still claimed that she just had worsening leg pain and that she thought this would help her legs  She denies any intention to hurt herself  She says that her Cymbalta is managing her depression well but does admit to feeling stressed at home  She says her disease can be difficult to manage and that she misses having her children at home who are not moved out of the house  Pt is agreeable to speaking with Psych    ED Vital Signs:   ED Triage Vitals [04/04/18 1924]   Temperature Pulse Respirations Blood Pressure SpO2   98 2 °F (36 8 °C) 93 20 138/84 98 %      Temp Source Heart Rate Source Patient Position - Orthostatic VS BP Location FiO2 (%)   Oral Monitor Sitting Left arm --      Pain Score       No Pain        Wt Readings from Last 1 Encounters:   04/04/18 95 4 kg (210 lb 5 1 oz)       Vital Signs (abnormal): none  Exam - anxious appearing  Vomited a large amount of yellow food content material   Scab over center of right upper eye lid  Lower legs in braces  Speech dysarthic,      Abnormal Labs/Diagnostic Test Results:   Bun 20  Creatinine 0 55  Glucose 152  INR 0 84  Wbc 11 34       Ct head - no acute intracranial abnormality    Labs 4/5 - bun 15  Creatinine 0 44  Blood gas 7 37/46 1/98/27 2/97%  UA trace ketones  Large blood  ED Treatment:   Medication Administration from 04/04/2018 1915 to 04/04/2018 2226       Date/Time Order Dose Route Action Action by Comments     04/04/2018 2136 sodium chloride 0 9 % bolus 1,000 mL 0 mL Intravenous Stopped Erica aH RN      04/04/2018 1942 sodium chloride 0 9 % bolus 1,000 mL 1,000 mL Intravenous New Bag Sylvan Peabody, RN      04/04/2018 1952 ondansetron (ZOFRAN) injection 4 mg 4 mg Intravenous Given Sylvan Peabody, RN      04/04/2018 2130 ondansetron (ZOFRAN) injection 4 mg 0 mg Intravenous Hold Erica Ha RN           Past Medical/Surgical History: Active Ambulatory Problems     Diagnosis Date Noted    No Active Ambulatory Problems     Resolved Ambulatory Problems     Diagnosis Date Noted    No Resolved Ambulatory Problems     No Additional Past Medical History       Admitting Diagnosis: Slurred speech [R47 81]  Vomiting [R11 10]  Depression [F32 9]  Facial droop [R29 810]  Overdose of medication [T50 901A]    Age/Sex: 56 y o  female    Assessment/Plan:   Overdose   Assessment & Plan     1  Pt took sixty 150 mg tablets of Trileptal               -currently denying SI, admits to depression   2  Vomiting and hypotensive prior to arrival                -resolving, most recent BP's in 075'J systolic   3  CT head negative for hemorrhage, CXR without signs of aspiration   4  Electrolytes wnl on arrival   5  Monitor on telemetry  6  Will check Trileptal level   7  Q4 Neuro checks   8  Supportive care with anti-emetics, IVF               -monitor BP, monitor electrolytes  9  Psych consult               -Pt will require 1:1          Guillermo-Danlos disease   Assessment & Plan     1  Will hold off on Trileptal for neuropathy for now, can continue tomorrow if pt is still alert and asymptomatic  2  Managed by PCP    3  Pt uses scooter and walker          Hyperlipidemia   Assessment & Plan     1  Continue statin            Prediabetes   Assessment & Plan     1  Carb-controlled diet         Admission Orders:  4/4/2018 2128 INPATIENT   Scheduled Meds:   Current Facility-Administered Medications:  acetaminophen 650 mg Oral Q6H PRN Cornelius Reyes PA-C    aluminum-magnesium hydroxide-simethicone 15 mL Oral Q6H PRN Cornelius Reyes PA-C    DULoxetine 20 mg Oral Daily Dang Guaman PA-C    enoxaparin 40 mg Subcutaneous Daily Dang Guaman PA-C    nystatin  Topical BID Cornelius Reyes PA-C    ondansetron 4 mg Intravenous Once Finsese Urrutia MD    ondansetron 4 mg Intravenous Q6H PRN Cornelius Reyes PA-C    OXcarbazepine 150 mg Oral BID Cornelius Reyes PA-C    Plecanatide 3 mg Oral Daily Dang Mi PA-C    polyethylene glycol 17 g Oral Daily PRN Cornelius Reyes PA-C    pravastatin 40 mg Oral Every Other Day Cornelius Reyes PA-C    sodium chloride 125 mL/hr Intravenous Continuous Finesse Urrutia MD Last Rate: 125 mL/hr (04/05/18 1005)     Continuous Infusions:   sodium chloride 125 mL/hr Last Rate: 125 mL/hr (04/05/18 1005)     PRN Meds: not used:   acetaminophen    aluminum-magnesium hydroxide-simethicone    ondansetron    polyethylene glycol    OTHER ORDERS:  Neuro checks q 4h    scds  Telemetry  Consult psyche  Speech    Per psyche- Gabriela Pilar is a 56 y o  female   Diagnosis:  Unspecified depression  Plan:   1  Patient is declining outpatient services at this time  2  Continue Cymbalta, she is at low dose can increase to 20 mg b i d   3   No evidence that this overdose had the intent of suicide, the patient is denying as well as  denying any increase in depressive symptoms or comments about self-harm  Patient is future oriented and concerned about her health and optimistic    4    expresses an interest in getting home health services as while he works she is home by herself, case management/ social work to assist in resources        Thank you,  [address]  [facility] in the [facility] by Jose Sheppard for 2017  Network Utilization Review Department  Phone: [phone]; Fax [phone]  ATTENTION: The Network Utilization Review Department is now centralized for our 7 Facilities  Make a note that we have a new phone and fax numbers for our Department  Please call with any questions or concerns to [phone] and carefully follow the prompts so that you are directed to the right person  All voicemails are confidential  Fax any determinations, approvals, denials, and requests for initial or continue stay review clinical to [phone]  Due to HIGH CALL volume, it would be easier if you could please send faxed requests to expedite your requests and in part, help us provide discharge notifications faster

## 2018-04-05 NOTE — ASSESSMENT & PLAN NOTE
1  Pt took sixty 150 mg tablets of Trileptal   -currently denying SI, admits to depression   2  Vomiting and hypotensive prior to arrival    -resolving, most recent BP's in 634'F systolic   3  CT head negative for hemorrhage, CXR without signs of aspiration   4  Electrolytes wnl on arrival   5  Monitor on telemetry  6  Will check Trileptal level   7  Q4 Neuro checks   8  Supportive care with anti-emetics, IVF   -monitor BP, monitor electrolytes  9   Psych consult   -Pt will require 1:1

## 2018-04-05 NOTE — SPEECH THERAPY NOTE
Speech-Language Pathology Bedside Swallow Evaluation        Patient Name: Yumiko DOZ Date: 2018     Problem List  Patient Active Problem List   Diagnosis    Overdose    Guillermo-Danlos disease    Hyperlipidemia    Prediabetes       Past Medical History  History reviewed  No pertinent past medical history  Past Surgical History  Past Surgical History:   Procedure Laterality Date     SECTION      x 2    X-STOP IMPLANTATION      2017         Current Medical Status  Pt is a 56 y o  female who presented to [address] [place] [address] 18 with overdose  Pt  presented with stroke-like symptoms from home via EMS yesterday evening  Her  found her with slurred speech and left-sided weakness  Family reported that over the past few days the patient had mentioned feeling tired with insomnia and overall not feeling well   mentioned also that patient has been off psychiatric medications that had been started during prior hospitalizations (upon review of records it appears she has had episodes of delirium during her hospitalizations)  Patient told staff that she took extra try Lamictal because of bad leg pain and denied suicidal ideation but the amount that she took varied, is documented 5 than 40 and today she states she took half a bottle  Early this morning she was noted to have possible altered mental status, was tossing and turning in bed and did not respond verbally to any questions       Past medical history:   Please see H&P for details      Special Studies:  CT-head: 18 no acute abnormality  CXR: 18  No active pulmonary disease    Social/Education/Vocational Hx:  Pt lives with family      Swallow Information   Current Risks for Dysphagia & Aspiration: AMS     Current Symptoms/Concerns:chg in MS    Current Diet: regular diet and thin liquids      Baseline Diet: thin liquids, regular diet      Baseline Assessment   Behavior/Cognition: alert    Speech/Language Status: able to participate in basic conversation and able to follow commands    Patient Positioning: upright in bed- difficulty holding head upright, needed several pillows for support       Swallow Mechanism Exam   Facial: symmetrical  Labial: WFL  Lingual: WFL  Velum: unable to visualize  Mandible: adequate ROM  Dentition: adequate  Vocal quality:clear/adequate   Volitional Cough: strong/productive   Respiratory: NC      Consistencies Assessed and Performance   Consistencies Administered: pt seen w/ breakfast tray- french toast, alatorre, applesauce, OJ by straw and coffee by cup    Oral Stage: pt had difficulty self-feeding  Adequate mastication, manipulation and transfer of foods  Able to suck from straw w/ good oral control, allowed ~2 sips at a time  Pt impulsive w/ cup drinking of coffee, decreased oral control, anterior spill/labial leakage  Pharyngeal Stage: swallows were timely and complete  Coughing episode noted after successive drinking from cup  No coughing w/ straw sips of thin liquids  Esophageal Concerns: none reported        Summary   Swallow Summary: Pt presents with Mild oropharyngeal dysphagia due to impulsive cup drinking, decreased oral control and aspiration risk w/ successive cup drinking of thin liquids  Recommendations: regular diet and thin liquids     Recommended Form of Meds: whole with puree     Aspiration precautions and compensatory swallowing strategies: upright posture, only feed when fully alert, slow rate of feeding and small bites/sips    Results Reviewed with: patient, RN and family     Treatment Recommendations: will follow up x1-2 as needed for diet tolerance and risk for aspiration  Dysphagia Goals: tolerate least restrictive diet w/o overt s/s aspiration

## 2018-04-06 VITALS
RESPIRATION RATE: 18 BRPM | OXYGEN SATURATION: 91 % | DIASTOLIC BLOOD PRESSURE: 75 MMHG | BODY MASS INDEX: 33.8 KG/M2 | WEIGHT: 210.32 LBS | SYSTOLIC BLOOD PRESSURE: 148 MMHG | HEIGHT: 66 IN | HEART RATE: 85 BPM | TEMPERATURE: 97.9 F

## 2018-04-06 PROCEDURE — 92526 ORAL FUNCTION THERAPY: CPT

## 2018-04-06 PROCEDURE — 99238 HOSP IP/OBS DSCHRG MGMT 30/<: CPT | Performed by: FAMILY MEDICINE

## 2018-04-06 RX ORDER — DULOXETIN HYDROCHLORIDE 20 MG/1
20 CAPSULE, DELAYED RELEASE ORAL 2 TIMES DAILY
Qty: 60 CAPSULE | Refills: 3 | Status: SHIPPED | OUTPATIENT
Start: 2018-04-06

## 2018-04-06 RX ADMIN — NYSTATIN: 100000 POWDER TOPICAL at 08:49

## 2018-04-06 RX ADMIN — SODIUM CHLORIDE 125 ML/HR: 0.9 INJECTION, SOLUTION INTRAVENOUS at 01:50

## 2018-04-06 RX ADMIN — ENOXAPARIN SODIUM 40 MG: 40 INJECTION SUBCUTANEOUS at 08:49

## 2018-04-06 RX ADMIN — DULOXETINE 20 MG: 20 CAPSULE, DELAYED RELEASE ORAL at 08:50

## 2018-04-06 RX ADMIN — SODIUM CHLORIDE 125 ML/HR: 0.9 INJECTION, SOLUTION INTRAVENOUS at 10:58

## 2018-04-06 RX ADMIN — OXCARBAZEPINE 150 MG: 150 TABLET ORAL at 08:49

## 2018-04-06 NOTE — DISCHARGE SUMMARY
Discharge- Analy Cardona 1960, 56 y o  female MRN: [id number]    Unit/Bed#: -01 Encounter: [id number]    Primary Care Provider: Meera Benz DO   Date and time admitted to hospital: 4/4/2018  7:16 PM        * Overdose   Assessment & Plan    1  Pt took sixty 150 mg tablets of TrileptaL  Patient was evaluated by Psychiatric Department which believed it was not a suicidal intention  Patient returned to baseline  She stated she is fine  Willing to go home         Prediabetes   Assessment & Plan    1  Carb-controlled diet  Hyperlipidemia   Assessment & Plan    1  Continue statin  Guillermo-Danlos disease   Assessment & Plan    Continue home medication  And outpatient follow-up          Disposition:     Home with VNA Services (Reminder: Complete face to face encounter)      Consultations During Hospital Stay:  · PSYCHIATRIC DEPARTMENT    Procedures Performed:   ·     Significant Findings / Test Results:     ·     Incidental Findings:   ·      Test Results Pending at Discharge (will require follow up):   ·    Outpatient Tests Requested:  ·     Complications:     Hospital Course:     Analy Cardona is a 56 y o  female patient who originally presented to the hospital on 4/4/2018 due to drug overdose  Patient stated she took this amount of pills due pain  Patient was evaluated by Psychiatric Department which determined current condition was not related with suicidal ideas  Patient has been clinically stable and no electrolyte imbalance she returned to baseline  She will be sent home with VNA  Condition at Discharge: stable     Discharge Day Visit / Exam:     Subjective: She reported to be fine, wants to go home   Denied any complains  Vitals: Blood Pressure: 148/75 (04/06/18 0832)  Pulse: 85 (04/06/18 0832)  Temperature: 97 9 °F (36 6 °C) (04/06/18 0832)  Temp Source: Oral (04/06/18 0387)  Respirations: 18 (04/06/18 0832)  Height: 5' 6" (167 6 cm) (04/05/18 1424)  Weight - Scale: 95 4 kg (210 lb 5 1 oz) (04/04/18 2233)  SpO2: 91 % (04/06/18 0832)  Exam:   Physical Exam   Constitutional: She is oriented to person, place, and time  No distress  Neck: Normal range of motion  Neck supple  No JVD present  No tracheal deviation present  No thyromegaly present  Cardiovascular: Normal rate, normal heart sounds and intact distal pulses  Exam reveals no gallop and no friction rub  No murmur heard  Pulmonary/Chest: No respiratory distress  She has no wheezes  She has no rales  She exhibits no tenderness  Abdominal: Soft  Bowel sounds are normal  She exhibits no distension and no mass  There is no tenderness  There is no rebound and no guarding  Obese   Musculoskeletal: She exhibits no edema, tenderness or deformity  Lymphadenopathy:     She has no cervical adenopathy  Neurological: She is alert and oriented to person, place, and time  Coordination abnormal    Skin: Skin is warm  She is not diaphoretic  Psychiatric: She has a normal mood and affect  Discussion with Family:  at bed side  Discharge instructions/Information to patient and family:   See after visit summary for information provided to patient and family  Provisions for Follow-Up Care:  See after visit summary for information related to follow-up care and any pertinent home health orders  Planned Readmission:      Discharge Statement:  I spent 30 minutes discharging the patient  This time was spent on the day of discharge  I had direct contact with the patient on the day of discharge  Greater than 50% of the total time was spent examining patient, answering all patient questions, arranging and discussing plan of care with patient as well as directly providing post-discharge instructions  Additional time then spent on discharge activities  Discharge Medications:  See after visit summary for reconciled discharge medications provided to patient and family        ** Please Note: This note has been constructed using a voice recognition system **

## 2018-04-06 NOTE — PLAN OF CARE
Problem: Potential for Falls  Goal: Patient will remain free of falls  INTERVENTIONS:  - Assess patient frequently for physical needs  -  Identify cognitive and physical deficits and behaviors that affect risk of falls  -  [place] fall precautions as indicated by assessment   - Educate patient/family on patient safety including physical limitations  - Instruct patient to call for assistance with activity based on assessment  - Modify environment to reduce risk of injury  - Consider OT/PT consult to assist with strengthening/mobility   Outcome: Progressing      Problem: Nutrition/Hydration-ADULT  Goal: Nutrient/Hydration intake appropriate for improving, restoring or maintaining nutritional needs  Monitor and assess patient's nutrition/hydration status for malnutrition (ex- brittle hair, bruises, dry skin, pale skin and conjunctiva, muscle wasting, smooth red tongue, and disorientation)  Collaborate with interdisciplinary team and initiate plan and interventions as ordered  Monitor patient's weight and dietary intake as ordered or per policy  Utilize nutrition screening tool and intervene per policy  Determine patient's food preferences and provide high-protein, high-caloric foods as appropriate       INTERVENTIONS:  - Monitor oral intake, urinary output, labs, and treatment plans  - Assess nutrition and hydration status and recommend course of action  - Evaluate amount of meals eaten  - Assist patient with eating if necessary   - Allow adequate time for meals  - Recommend/ encourage appropriate diets, oral nutritional supplements, and vitamin/mineral supplements  - Order, calculate, and assess calorie counts as needed  - Recommend, monitor, and adjust tube feedings and TPN/PPN based on assessed needs  - Assess need for intravenous fluids  - Provide specific nutrition/hydration education as appropriate  - Include patient/family/caregiver in decisions related to nutrition   Outcome: Progressing

## 2018-04-06 NOTE — PLAN OF CARE
Problem: DISCHARGE PLANNING - CARE MANAGEMENT  Goal: Discharge to post-acute care or home with appropriate resources  INTERVENTIONS:  - Conduct assessment to determine patient/family and health care team treatment goals, and need for post-acute services based on payer coverage, community resources, and patient preferences, and barriers to discharge  - Address psychosocial, clinical, and financial barriers to discharge as identified in assessment in conjunction with the patient/family and health care team  - Arrange appropriate level of post-acute services according to patients   needs and preference and payer coverage in collaboration with the physician and health care team  - Communicate with and update the patient/family, physician, and health care team regarding progress on the discharge plan  - Arrange appropriate transportation to post-acute venues  Outcome: Completed Date Met: 04/06/18  LOS 2  Not a bundle; Not a readmission  CM reviewed discharge planning process including the following: identifying caregivers at home, preference for d/c planning needs, [facility] Meds to Bed program, availability of treatment team to discuss questions or concerns patient and/or family may have regarding diagnosis, plan of care, old or new medications and discharge planning   CM will continue to follow for care coordination and update assessment as necessary  CM name and role reviewed and Discharge Checklist provided  Encouraged patient and caregiver to review prior to discharge  CM presented the option of VNA at home and pt and SO agreed to this  Pt prefers to have [facility] at home  Cm made referral and explained pt is stable for DC today  SO asked for assistance with follow up with psychologist or SW as well as support groups  Cm provided [employer] card and encouraged them to contact the number as they would be able to assist with hooking pt up with outpt resources and services    SO asked about assistance at home with cooking and cleaning  Cm explained those services would be private pay however they are available  He stated she is not able to afford that  No further Cm interventions needed at this time

## 2018-04-06 NOTE — SPEECH THERAPY NOTE
Speech Language/Pathology    Speech/Language Pathology Progress Note    Patient Name: David Albert  VNUJY'C Date: 2018     Problem List  Patient Active Problem List   Diagnosis    Overdose    Guillermo-Danlos disease    Hyperlipidemia    Prediabetes        Past Medical History  History reviewed  No pertinent past medical history  Past Surgical History  Past Surgical History:   Procedure Laterality Date     SECTION      x 2    X-STOP IMPLANTATION               Subjective:  Pt seen for dysphagia tx  Pt sitting up in chair,  at bedside  Pt alert and pleasant  Objective:  Pt states that she has had no difficulty swallowing today  Her  affirms this, and he feels pt's mental status is nearly back to her normal baseline  Pt was observed eating lunch  Prior to eating, pt was reminded to eat slowly, take small bites and sips  She ate half a roast beef sandwich with lettuce, tomato and pathak, coleslaw, and chicken noodle soup  She drank cranberry juice by cup  Pt was somewhat impulsive when drinking the juice, taking several sips on a row, however pt seemed to tolerate this without difficulty  She ate the food at a normal rate, with adequate mastication and bolus formation/transfer, and pharyngeal swallows appear timely  There were no clinical s/s of aspiration  Reviewed recommendation with pt and  to continue to eat slowly, take small bites and sips, and sit up while eating  Both verbalized understanding  Pt is being discharged home this afternoon, and does not appear to need further swallow tx  Assessment:  Good toleration of regular diet and thin liquids  No s/s of aspiration  Plan/Recommendations:  Continue with regular diet and thin liquids  Discharge from [address] [place] [address]  No further tx warranted

## 2018-04-06 NOTE — SOCIAL WORK
LOS 2   Not a bundle; Not a readmission  CM reviewed discharge planning process including the following: identifying caregivers at home, preference for d/c planning needs, [facility] Meds to Bed program, availability of treatment team to discuss questions or concerns patient and/or family may have regarding diagnosis, plan of care, old or new medications and discharge planning   CM will continue to follow for care coordination and update assessment as necessary  CM name and role reviewed and Discharge Checklist provided  Encouraged patient and caregiver to review prior to discharge  CM presented the option of VNA at home and pt and SO agreed to this  Pt prefers to have [facility] at home  Cm made referral and explained pt is stable for DC today  SO asked for assistance with follow up with psychologist or SW as well as support groups  Cm provided [employer] card and encouraged them to contact the number as they would be able to assist with hooking pt up with outpt resources and services  SO asked about assistance at home with cooking and cleaning  Cm explained those services would be private pay however they are available  He stated she is not able to afford that  No further Cm interventions needed at this time

## 2018-04-06 NOTE — ASSESSMENT & PLAN NOTE
1  Pt took sixty 150 mg tablets of TrileptaL  Patient was evaluated by Psychiatric Department which believed it was not a suicidal intention  Patient returned to baseline  She stated she is fine   Willing to go home

## 2018-04-08 LAB — OXCARBAZEPINE SERPL-MCNC: 63 UG/ML (ref 10–35)

## 2018-04-09 NOTE — CASE MANAGEMENT
Notification of Discharge  This is a Notification of Discharge from our facility [address]  Please be advised that this patient has been discharge from our facility  Below you will find the admission and discharge date and time including the patients disposition  PRESENTATION DATE: 4/4/2018  7:16 PM  IP ADMISSION DATE: 4/4/18 2128  DISCHARGE DATE: 4/6/2018  5:20 PM  DISPOSITION: Home with [address] [place] [address] in the [facility] by Jose Sheppard for 2017  Network Utilization Review Department  Phone: [phone]; Fax [phone]  ATTENTION: The Network Utilization Review Department is now centralized for our 7 Facilities  Make a note that we have a new phone and fax numbers for our Department  Please call with any questions or concerns to [phone] and carefully follow the prompts so that you are directed to the right person  All voicemails are confidential  Fax any determinations, approvals, denials, and requests for initial or continue stay review clinical to [phone]  Due to HIGH CALL volume, it would be easier if you could please send faxed requests to expedite your requests and in part, help us provide discharge notifications faster      Reference #[id number]

## 2018-05-01 ENCOUNTER — TELEPHONE (OUTPATIENT)
Dept: PSYCHIATRY | Facility: CLINIC | Age: 58
End: 2018-05-01

## 2018-05-01 NOTE — TELEPHONE ENCOUNTER
Behavorial Health Outpatient Intake Questions    Referred by:[facility]    Check with provider before scheduling    Are there any developmental disabilities? No    Does the patient have hearing impairment? No    Does the patient have ICM or CTT? No    Taking injectable psychiatric medications? NoIf yes, patient can not be seen here  Has the patient ever seen or currently see a psychiatrist? No If yes who/when? Has the patient ever seen or currently see a therapist? Yes [facility]    How many visits did the pt have for previous psychiatric treatment? STARTED April 2018     History    Has the patient served in the [facility]? No    If yes, have you had combat services? No    Was the patient activated into federal active duty as a member of the national guard or reserve? No    Minor Child    Who has custody of the child? N/A    Is there a custody agreement? N/A    If there is a custody agreement remind parent that they must bring a copy to the first appt or they will not be seen  [facility]     Presenting Problem (in patient's words) DEPRESSION,ADMITTED TO [facility] April 4,2018,NOT SEEN BY PSYCHIATRIST  PATIENT WANTS TO ATTEND SUPPORT GROUP    Substance Abuse:No concerns of substance abuse are reported  Has the patient been seen here previously, either inpatient or outpatient? No outpatient    If seen as outpatient, what provider(s) did the patient see? N/A    A member of the patient's family has been in therapy here with  NO    ACCEPTED as a patient Yes Appointment Date:     Referred Elsewhere?  No    Primary Care Physician: DO [facility]UDM[facility]    PCP telephone number: [phone]    SUB: JUAN ALBERTO  INS: BLUE CROSS [place] BLUE  ID: [id number]     GRP: [id number]  [phone]    SECONDARY:  RYLAND TAI  ID: [id number]

## 2018-05-18 ENCOUNTER — DOCUMENTATION (OUTPATIENT)
Dept: PSYCHIATRY | Facility: CLINIC | Age: 58
End: 2018-05-18

## 2018-05-18 ENCOUNTER — OFFICE VISIT (OUTPATIENT)
Dept: BEHAVIORAL/MENTAL HEALTH CLINIC | Facility: CLINIC | Age: 58
End: 2018-05-18
Payer: COMMERCIAL

## 2018-05-18 DIAGNOSIS — F41.9 ANXIETY DISORDER, UNSPECIFIED TYPE: ICD-10-CM

## 2018-05-18 DIAGNOSIS — F32.2 DEPRESSION, MAJOR, SINGLE EPISODE, SEVERE (HCC): Primary | ICD-10-CM

## 2018-05-18 PROCEDURE — 90791 PSYCH DIAGNOSTIC EVALUATION: CPT | Performed by: SOCIAL WORKER

## 2018-05-18 NOTE — PSYCH
Assessment/Plan:      There are no diagnoses linked to this encounter  Subjective:      Patient ID: Gabriela Quezada is a 66 y o  female  HPI:     Pre-morbid level of function and History of Present Illness: Interested mindfulness group and depression and anxiety support group  Previous Psychiatric/psychological treatment/year: NA  Current Psychiatrist/Therapist: [facility] and/or Partial and Other [employer] Used (CTT, ICM, VNA): Inpatient ALF [facility] 2018      Problem Assessment:     SOCIAL/VOCATION:  Family Constellation (include parents, relationship with each and pertinent Psych/Medical History):     No family history on file  Mother: Rodney Charles  Spouse: Vince Backjenna   Father: Gonzalo See: Gera Kidney   Sibling: Jose La-, Gely-54   Sibling: NA   Children: NA   Other: NA    Edson Spencer relates best to Zachary Hunt  she lives with  and daughter  she does not live alone  Domestic Violence: No past history of domestic violence, There is not suspected domestic violence and There is no history of child abuse    Additional Comments related to family/relationships/peer support: Parents and Janice Cage are   Good relationship with marital family and Ray Number  Not close to oldest brother  School or Work History (strengths/limitations/needs): Unemployed  Worked for [employer]  Her highest grade level achieved was 2 years of college     history includes NA    Financial status includes SSD    LEISURE ASSESSMENT: Creative, read, crafts, watching sports, baseball, shopping, fashion, music, theater, no groups or clubs  her primary language is [place]  There is no secondary language   Ethnic considerations are Evangelical  Religions affiliations and level of involvement [employer] practicing   Does spirituality help you cope?  No    FUNCTIONAL STATUS: There has been a recent change in Edson Spencer ability to do the following: walking, going up or down stairs and reaching for things    Level of Assistance Needed/By Whom?: In [facility]    Mariah Bernard learns best by  demostration    SUBSTANCE ABUSE ASSESSMENT: no substance abuse    Substance/Route/Age/Amount/Frequency/Last Use: NA    DETOX HISTORY: NA    Previous detox/rehab treatment: NA    HEALTH ASSESSMENT: no referral to PCP needed gained 10 lbs or more    LEGAL: No Mental Health Advance Directive or Power of  on file and NA    Prenatal History: N/A    Delivery History: N/A    Developmental Milestones: N/A  Temperament as an infant was N/A  Temperament as a toddler was N/A  Temperament at school age was N/A  Temperament as a teenager was N/A  Risk Assessment:   The following ratings are based on my interview(s) with patient    Risk of Harm to Self:   Demographic risk factors include   Historical Risk Factors include NA  Recent Specific Risk Factors include chronic pain or health problems and hard on self, gets frustrated with self, hx suicide attempt  Additional Factors for a Child or Adolescent NA    Risk of Harm to Others:   Demographic Risk Factors include NA  Historical Risk Factors include NA  Recent Specific Risk Factors include NA    Access to Weapons:   Mariah Bernard has access to the following weapons: NA   The following steps have been taken to ensure weapons are properly secured: NA    Based on the above information, the client presents the following risk of harm to self or others:  medium    The following interventions are recommended:   no intervention changes    Notes regarding this Risk Assessment: NA        Review Of Systems:     Mood Anxiety, Depression and Emotional Lability   Behavior NA   Thought Content Unreasonalbe or Irrational Fears and fear of driving   General Emotional Problems, Sleep Disturbances, Decreased Functioning and Difficulty falling asleep   Personality NA   Other Psych Symptoms anhedonia, mind goes blank, sweating,    Constitutional NA   ENT NA Cardiovascular NA   Respiratory NA   Gastrointestinal Constipation   Genitourinary Menopause   Musculoskeletal Guillermo-Danlos Syndrome   Integumentary NA   Neurological Headache and Neuropathy   Endocrine NA         Mental status:  Appearance calm and cooperative , adequate hygiene and grooming and good eye contact    Mood depressed and anxious   Affect affect appropriate    Speech NA   Thought Processes coherent/organized   Hallucinations no hallucinations present    Thought Content no delusions   Abnormal Thoughts no suicidal thoughts  and no homicidal thoughts    Orientation  oriented to person and place and time   Remote Memory short term memory intact and long term memory intact   Attention Span concentration impaired   Intellect Appears to be of Average Intelligence   Fund of Knowledge displays adequate knowledge of current events, adequate fund of knowledge regarding past history and adequate fund of knowledge regarding vocabulary    Insight Limited insight   Judgement judgment was limited   Muscle Strength Decreased muscle strength   Language no difficulty naming common objects, no difficulty repeating a phrase  and no difficulty writing a sentence    Pain moderate to severe   Pain Scale 6

## 2018-05-18 NOTE — PROGRESS NOTES
Pt is considering mindfulness group  She has to take [facility]  Pt will decide if she wants to join the group and call office

## 2018-06-13 ENCOUNTER — OFFICE VISIT (OUTPATIENT)
Dept: BEHAVIORAL/MENTAL HEALTH CLINIC | Facility: CLINIC | Age: 58
End: 2018-06-13
Payer: COMMERCIAL

## 2018-06-13 DIAGNOSIS — F32.2 DEPRESSION, MAJOR, SINGLE EPISODE, SEVERE (HCC): Primary | ICD-10-CM

## 2018-06-13 DIAGNOSIS — F41.9 ANXIETY DISORDER, UNSPECIFIED TYPE: ICD-10-CM

## 2018-06-13 PROCEDURE — 90853 GROUP PSYCHOTHERAPY: CPT | Performed by: SOCIAL WORKER

## 2018-06-13 NOTE — PSYCH
Goals: None    D: Two people attended group  Pt disclosed about her physical ailments  She participated in breathing exercises and the 5 senses relaxation response  This was patient's first time in group  She stated the experience was different    A: Pt was relaxed at the end of the session  P: Pt will continue to practice mindfulness      Intervention techniques; education, explanation, practice    RTO: Wednesday, June 27, 2018 at 5 pm

## 2018-06-14 ENCOUNTER — DOCUMENTATION (OUTPATIENT)
Dept: PSYCHIATRY | Facility: CLINIC | Age: 58
End: 2018-06-14

## 2018-06-14 NOTE — PROGRESS NOTES
Courtney Oliveros  1960       Date of Initial Treatment Plan: 6/14/18  Date of Current Treatment Plan: 06/14/18    Treatment Plan Number 1     Strengths/Personal Resources for Self Care: I am creative  I like to read, do crafts, watch sports, baseball, shopping, fashion, music, theater, Learns best by demonstration    Diagnosis: Depression,  Anxiety  No diagnosis found  Area of Needs:  1  I am depressed and anxious      Long Term Goal 1: AI will manage my depression and anxiety    Target Date:  Completion Date:          Short Term Objectives for Goal 1: A  I will attend mindfulness group   Target Date: 9/18    Long Term Goal 2: N/A    Target Date: N/A  Completion Date: N/A    Short Term Objectives for Goal 2: N/A         Long Term Goal # 3: N/A     Target Date: N/A  Completion Date: N/A    Short Term Objectives for Goal 3: N/A    GOAL 1: Modality: Group and The person(s) responsible for carrying out the plan is  Edmond    GOAL 2: Modality: Individual NAx per month   Completion Date NA     GOAL 3: Modality: Individual NAx per month   Completion Date NA      Behavioral Health Treatment Plan [facility]: Diagnosis and Treatment Plan explained to Perla Lares relates understanding diagnosis and is agreeable to Treatment Plan         Client Comments : Please share your thoughts, feelings, need and/or experiences regarding your treatment plan:       __________________________________________________________________    __________________________________________________________________    __________________________________________________________________    __________________________________________________________________    _______________________________________                Patient signature, Date Time: __________________________________________        Therapist signature, Date Time: Veverly Hotter, LCSW             Physician cosigner signature, Date, Time: ________________________________

## 2018-06-14 NOTE — PROGRESS NOTES
Treatment Plan Tracking    # 1Treatment Plan not completed within required time limits due to: Client did not schedule an appointment within 15 days of initial assessment  and Pt had not committed to the group at the time of the assessment  She was put on the schedule a few hours before the group  Taniya Barney

## 2018-06-28 ENCOUNTER — DOCUMENTATION (OUTPATIENT)
Dept: PSYCHIATRY | Facility: CLINIC | Age: 58
End: 2018-06-28

## 2018-06-28 NOTE — PROGRESS NOTES
Pt was bumped for group on Wednesday, June 27 at 5 pm  Therapist was out of office      RTO: Wednesday, July 11, 2018 at 5 pm

## 2018-06-30 ENCOUNTER — HOSPITAL ENCOUNTER (INPATIENT)
Facility: HOSPITAL | Age: 58
LOS: 2 days | Discharge: HOME | DRG: 603 | End: 2018-07-02
Attending: EMERGENCY MEDICINE | Admitting: INTERNAL MEDICINE
Payer: COMMERCIAL

## 2018-06-30 ENCOUNTER — APPOINTMENT (EMERGENCY)
Dept: RADIOLOGY | Facility: HOSPITAL | Age: 58
DRG: 603 | End: 2018-06-30
Payer: COMMERCIAL

## 2018-06-30 DIAGNOSIS — Q79.60 EHLERS-DANLOS DISEASE: ICD-10-CM

## 2018-06-30 DIAGNOSIS — L03.032 CELLULITIS OF THIRD TOE OF LEFT FOOT: ICD-10-CM

## 2018-06-30 DIAGNOSIS — L03.032 CELLULITIS OF GREAT TOE, LEFT: ICD-10-CM

## 2018-06-30 DIAGNOSIS — L03.032 CELLULITIS OF SECOND TOE OF LEFT FOOT: ICD-10-CM

## 2018-06-30 DIAGNOSIS — L03.032 CELLULITIS OF FIFTH TOE, LEFT: Primary | ICD-10-CM

## 2018-06-30 DIAGNOSIS — L03.032 CELLULITIS OF FOURTH TOE OF LEFT FOOT: ICD-10-CM

## 2018-06-30 DIAGNOSIS — L03.116 CELLULITIS OF LEFT LOWER EXTREMITY: ICD-10-CM

## 2018-06-30 PROBLEM — Z87.81 HISTORY OF TIBIAL FRACTURE: Status: ACTIVE | Noted: 2018-06-30

## 2018-06-30 PROBLEM — L03.90 CELLULITIS: Status: ACTIVE | Noted: 2018-06-30

## 2018-06-30 PROBLEM — L08.9 TOE INFECTION: Status: ACTIVE | Noted: 2018-06-30

## 2018-06-30 LAB
ALBUMIN SERPL BCP-MCNC: 3.6 G/DL (ref 3.5–5)
ALP SERPL-CCNC: 105 U/L (ref 46–116)
ALT SERPL W P-5'-P-CCNC: 23 U/L (ref 12–78)
ANION GAP SERPL CALCULATED.3IONS-SCNC: 7 MMOL/L (ref 4–13)
APTT PPP: 39 SECONDS (ref 24–36)
AST SERPL W P-5'-P-CCNC: 19 U/L (ref 5–45)
BASOPHILS # BLD AUTO: 0.08 THOUSANDS/ΜL (ref 0–0.1)
BASOPHILS NFR BLD AUTO: 1 % (ref 0–1)
BILIRUB SERPL-MCNC: 0.3 MG/DL (ref 0.2–1)
BUN SERPL-MCNC: 18 MG/DL (ref 5–25)
CALCIUM SERPL-MCNC: 9.7 MG/DL (ref 8.3–10.1)
CHLORIDE SERPL-SCNC: 101 MMOL/L (ref 100–108)
CO2 SERPL-SCNC: 31 MMOL/L (ref 21–32)
CREAT SERPL-MCNC: 0.52 MG/DL (ref 0.6–1.3)
EOSINOPHIL # BLD AUTO: 0.24 THOUSAND/ΜL (ref 0–0.61)
EOSINOPHIL NFR BLD AUTO: 3 % (ref 0–6)
ERYTHROCYTE [DISTWIDTH] IN BLOOD BY AUTOMATED COUNT: 13.4 % (ref 11.6–15.1)
GFR SERPL CREATININE-BSD FRML MDRD: 107 ML/MIN/1.73SQ M
GLUCOSE SERPL-MCNC: 89 MG/DL (ref 65–140)
HCT VFR BLD AUTO: 40.5 % (ref 34.8–46.1)
HGB BLD-MCNC: 13.6 G/DL (ref 11.5–15.4)
HOLD SPECIMEN: NORMAL
INR PPP: 0.88 (ref 0.86–1.17)
LACTATE SERPL-SCNC: 0.9 MMOL/L (ref 0.5–2)
LYMPHOCYTES # BLD AUTO: 2.11 THOUSANDS/ΜL (ref 0.6–4.47)
LYMPHOCYTES NFR BLD AUTO: 24 % (ref 14–44)
MCH RBC QN AUTO: 31.3 PG (ref 26.8–34.3)
MCHC RBC AUTO-ENTMCNC: 33.6 G/DL (ref 31.4–37.4)
MCV RBC AUTO: 93 FL (ref 82–98)
MONOCYTES # BLD AUTO: 0.65 THOUSAND/ΜL (ref 0.17–1.22)
MONOCYTES NFR BLD AUTO: 7 % (ref 4–12)
NEUTROPHILS # BLD AUTO: 5.69 THOUSANDS/ΜL (ref 1.85–7.62)
NEUTS SEG NFR BLD AUTO: 65 % (ref 43–75)
PLATELET # BLD AUTO: 267 THOUSANDS/UL (ref 149–390)
PMV BLD AUTO: 10.5 FL (ref 8.9–12.7)
POTASSIUM SERPL-SCNC: 4.1 MMOL/L (ref 3.5–5.3)
PROT SERPL-MCNC: 7.7 G/DL (ref 6.4–8.2)
PROTHROMBIN TIME: 11.7 SECONDS (ref 11.8–14.2)
RBC # BLD AUTO: 4.34 MILLION/UL (ref 3.81–5.12)
SODIUM SERPL-SCNC: 139 MMOL/L (ref 136–145)
WBC # BLD AUTO: 8.77 THOUSAND/UL (ref 4.31–10.16)

## 2018-06-30 PROCEDURE — 87205 SMEAR GRAM STAIN: CPT | Performed by: INTERNAL MEDICINE

## 2018-06-30 PROCEDURE — 73630 X-RAY EXAM OF FOOT: CPT

## 2018-06-30 PROCEDURE — 99284 EMERGENCY DEPT VISIT MOD MDM: CPT

## 2018-06-30 PROCEDURE — 87040 BLOOD CULTURE FOR BACTERIA: CPT | Performed by: EMERGENCY MEDICINE

## 2018-06-30 PROCEDURE — 87070 CULTURE OTHR SPECIMN AEROBIC: CPT | Performed by: INTERNAL MEDICINE

## 2018-06-30 PROCEDURE — 99223 1ST HOSP IP/OBS HIGH 75: CPT | Performed by: INTERNAL MEDICINE

## 2018-06-30 PROCEDURE — 83605 ASSAY OF LACTIC ACID: CPT | Performed by: EMERGENCY MEDICINE

## 2018-06-30 PROCEDURE — 87186 SC STD MICRODIL/AGAR DIL: CPT | Performed by: INTERNAL MEDICINE

## 2018-06-30 PROCEDURE — 85730 THROMBOPLASTIN TIME PARTIAL: CPT | Performed by: EMERGENCY MEDICINE

## 2018-06-30 PROCEDURE — 85610 PROTHROMBIN TIME: CPT | Performed by: EMERGENCY MEDICINE

## 2018-06-30 PROCEDURE — 80053 COMPREHEN METABOLIC PANEL: CPT | Performed by: EMERGENCY MEDICINE

## 2018-06-30 PROCEDURE — 36415 COLL VENOUS BLD VENIPUNCTURE: CPT | Performed by: EMERGENCY MEDICINE

## 2018-06-30 PROCEDURE — 85025 COMPLETE CBC W/AUTO DIFF WBC: CPT | Performed by: EMERGENCY MEDICINE

## 2018-06-30 PROCEDURE — 87077 CULTURE AEROBIC IDENTIFY: CPT | Performed by: INTERNAL MEDICINE

## 2018-06-30 PROCEDURE — 87147 CULTURE TYPE IMMUNOLOGIC: CPT | Performed by: INTERNAL MEDICINE

## 2018-06-30 RX ORDER — QUETIAPINE FUMARATE 25 MG/1
25 TABLET, FILM COATED ORAL
COMMUNITY

## 2018-06-30 RX ORDER — NYSTATIN 100000 [USP'U]/G
POWDER TOPICAL 2 TIMES DAILY
Status: DISCONTINUED | OUTPATIENT
Start: 2018-06-30 | End: 2018-07-02 | Stop reason: HOSPADM

## 2018-06-30 RX ORDER — ACETAMINOPHEN 325 MG/1
650 TABLET ORAL EVERY 6 HOURS PRN
Status: DISCONTINUED | OUTPATIENT
Start: 2018-06-30 | End: 2018-07-02 | Stop reason: HOSPADM

## 2018-06-30 RX ORDER — DULOXETIN HYDROCHLORIDE 20 MG/1
20 CAPSULE, DELAYED RELEASE ORAL 2 TIMES DAILY
Status: DISCONTINUED | OUTPATIENT
Start: 2018-06-30 | End: 2018-06-30

## 2018-06-30 RX ORDER — ONDANSETRON 2 MG/ML
4 INJECTION INTRAMUSCULAR; INTRAVENOUS EVERY 6 HOURS PRN
Status: DISCONTINUED | OUTPATIENT
Start: 2018-06-30 | End: 2018-07-02 | Stop reason: HOSPADM

## 2018-06-30 RX ORDER — DOCUSATE SODIUM 100 MG/1
100 CAPSULE, LIQUID FILLED ORAL 2 TIMES DAILY PRN
Status: DISCONTINUED | OUTPATIENT
Start: 2018-06-30 | End: 2018-07-02 | Stop reason: HOSPADM

## 2018-06-30 RX ORDER — PRAVASTATIN SODIUM 40 MG
40 TABLET ORAL EVERY OTHER DAY
Status: DISCONTINUED | OUTPATIENT
Start: 2018-07-02 | End: 2018-07-01

## 2018-06-30 RX ORDER — OXCARBAZEPINE 150 MG/1
150 TABLET, FILM COATED ORAL 2 TIMES DAILY
Status: DISCONTINUED | OUTPATIENT
Start: 2018-06-30 | End: 2018-07-02 | Stop reason: HOSPADM

## 2018-06-30 RX ORDER — PRAVASTATIN SODIUM 40 MG
40 TABLET ORAL EVERY OTHER DAY
Status: DISCONTINUED | OUTPATIENT
Start: 2018-06-30 | End: 2018-06-30

## 2018-06-30 RX ORDER — DULOXETIN HYDROCHLORIDE 20 MG/1
20 CAPSULE, DELAYED RELEASE ORAL DAILY
Status: DISCONTINUED | OUTPATIENT
Start: 2018-07-01 | End: 2018-07-02 | Stop reason: HOSPADM

## 2018-06-30 RX ORDER — CALCIUM CARBONATE 200(500)MG
1000 TABLET,CHEWABLE ORAL DAILY PRN
Status: DISCONTINUED | OUTPATIENT
Start: 2018-06-30 | End: 2018-07-02 | Stop reason: HOSPADM

## 2018-06-30 RX ORDER — QUETIAPINE FUMARATE 25 MG/1
25 TABLET, FILM COATED ORAL
Status: DISCONTINUED | OUTPATIENT
Start: 2018-06-30 | End: 2018-07-02 | Stop reason: HOSPADM

## 2018-06-30 RX ADMIN — VANCOMYCIN HYDROCHLORIDE 1500 MG: 1 INJECTION, POWDER, LYOPHILIZED, FOR SOLUTION INTRAVENOUS at 19:57

## 2018-06-30 RX ADMIN — CEFEPIME HYDROCHLORIDE 2000 MG: 2 INJECTION, POWDER, FOR SOLUTION INTRAVENOUS at 18:51

## 2018-06-30 RX ADMIN — QUETIAPINE FUMARATE 25 MG: 25 TABLET ORAL at 21:50

## 2018-06-30 RX ADMIN — METRONIDAZOLE 500 MG: 500 INJECTION, SOLUTION INTRAVENOUS at 21:51

## 2018-06-30 RX ADMIN — OXCARBAZEPINE 150 MG: 150 TABLET ORAL at 21:49

## 2018-06-30 NOTE — H&P
History and Physical - [facility] ER Internal Medicine    Patient Information: Alo Armenta 66 y o  female MRN: [id number]  Unit/Bed#: ED 15 Encounter: [id number]  Admitting Physician: Milad Dumont MD  PCP: José Herrera DO  Date of Admission:  06/30/18    Assessment/Plan:    Hospital Problem List:     Principal Problem:    Cellulitis of left lower extremity  Active Problems:    Guillermo-Danlos disease    Hyperlipidemia    Depression, major, single episode, severe ([address] )    Anxiety disorder    Toe infection    History of tibial fracture      Plan for the Primary Problem(s):  · Cellulitis left lower extremity and toes/possible osteomyelitis  · Received cefepime and vancomycin in ED  · Will obtain cultures from the draining wounds of dose  · Will continue with Ancef and Flagyl  · Consult ID and Podiatry    Plan for Additional Problems:   · Guillermo-Danlos disease with neuropathy  · Supportive care  · Continue Trileptal  · Anxiety and depression  · Continue Cymbalta  · Hyperlipidemia  · On statin  · ? Pre diabetes  · Check A1c  · History of left tibial fracture status post IM nail    VTE Prophylaxis: Enoxaparin (Lovenox)  / sequential compression device   Code Status:  Full code  POLST: There is no POLST form on file for this patient (pre-hospital)    Anticipated Length of Stay:  Patient will be admitted on an Inpatient basis with an anticipated length of stay of  > 2 midnights  Justification for Hospital Stay:  IV antibiotics    Total Time for Visit, including Counseling / Coordination of Care: 70 minutes  Greater than 50% of this total time spent on direct patient counseling and coordination of care  Chief Complaint:  Toe swelling    History of Present Illness:    Alo Armenta is a 66 y o  female who presents with left foot swelling, willing and foul smell for 1 week  She was on holiday with her , did not tell her  about the symptoms    Yesterday she returned from holiday, went to urgent care clinic but was told to go to ED because of infection  She denies any pain as she suffers from neuropathy  Denies fever chills or rigors  Denies any previous history of skin infections  Denies any injury to toes  Reports unsteady gait due to lax and deformed joints and arthritis,  Review of Systems:    Review of Systems  Twelve point review systems negative except noted above  Past Medical and Surgical History:     Past Medical History:   Diagnosis Date    Bilateral foot-drop     Guillermo-Danlos syndrome     Hyperlipidemia     Neuropathy     Spinal stenosis        Past Surgical History:   Procedure Laterality Date     SECTION      x 2    X-STOP IMPLANTATION      2017       Meds/Allergies:    Prior to Admission medications    Medication Sig Start Date End Date Taking? Authorizing Provider   cephalexin (KEFLEX) 250 mg capsule Take 500 mg by mouth every 8 (eight) hours    Historical Provider, MD   DULoxetine (CYMBALTA) 20 mg capsule Take 1 capsule (20 mg total) by mouth 2 (two) times a day 18   Kemal Papa, MD   nystatin (MYCOSTATIN) powder Apply topically 2 (two) times a day 18   Historical Provider, MD   OXcarbazepine (TRILEPTAL) 150 mg tablet Take 150 mg by mouth 2 (two) times a day    Historical Provider, MD   Plecanatide 3 MG TABS Take 3 mg by mouth daily 18   Historical Provider, MD   pravastatin (PRAVACHOL) 40 mg tablet Take 40 mg by mouth every other day    Historical Provider, MD     I have reviewed home medications with patient family member  Allergies:    Allergies   Allergen Reactions    Ciprofloxacin        Social History:     Marital Status: /Civil Union   Occupation:  Disabled  Patient Pre-hospital Living Situation:  Lives with   Patient Pre-hospital Level of Mobility:  Limited due to arthritis  Patient Pre-hospital Diet Restrictions:  None  Substance Use History:   History   Alcohol Use No     History   Smoking Status    Former Smoker Smokeless Tobacco    Never Used     History   Drug Use No       Family History:    non-contributory    Physical Exam:     Vitals:   Blood Pressure: 124/72 (06/30/18 1742)  Pulse: 84 (06/30/18 1742)  Temperature: 99 °F (37 2 °C) (06/30/18 1553)  Temp Source: Oral (06/30/18 1553)  Respirations: 16 (06/30/18 1742)  SpO2: 98 % (06/30/18 1742)    Physical Exam     Gen -Patient comfortable in bed  Neck- Supple  No thyromegaly or lymphadenopathy  Lungs-Clear bilaterally without any wheeze or rales   Heart S1-S2, regular rate and rhythm, no murmurs  Abdomen-soft nontender, no organomegaly  Bowel sounds present  Extremities-deformed joints,  Full smelling discharge from tip of all toes on left  Erythema spreading up to mid calf  Pitting edema present  Skin- erythema and open wound left toes  Neuro-awake alert and oriented         Additional Data:     Lab Results: I have personally reviewed pertinent reports  Results from last 7 days  Lab Units 06/30/18  1646   WBC Thousand/uL 8 77   HEMOGLOBIN g/dL 13 6   HEMATOCRIT % 40 5   PLATELETS Thousands/uL 267   NEUTROS PCT % 65   LYMPHS PCT % 24   MONOS PCT % 7   EOS PCT % 3       Results from last 7 days  Lab Units 06/30/18  1646   SODIUM mmol/L 139   POTASSIUM mmol/L 4 1   CHLORIDE mmol/L 101   CO2 mmol/L 31   BUN mg/dL 18   CREATININE mg/dL 0 52*   CALCIUM mg/dL 9 7   TOTAL PROTEIN g/dL 7 7   BILIRUBIN TOTAL mg/dL 0 30   ALK PHOS U/L 105   ALT U/L 23   AST U/L 19   GLUCOSE RANDOM mg/dL 89       Results from last 7 days  Lab Units 06/30/18  1646   INR  0 88       Imaging: I have personally reviewed pertinent reports  No results found  EKG, Pathology, and Other Studies Reviewed on Admission:   · EKG:     Allscripts / Epic Records Reviewed: Yes     ** Please Note: This note has been constructed using a voice recognition system   **

## 2018-06-30 NOTE — ED NOTES
Nursing student assessments and treatments reviewed and approved by myself       Cristiane Parnell RN  06/30/18 6212

## 2018-06-30 NOTE — ED PROVIDER NOTES
History  Chief Complaint   Patient presents with    Toe Swelling     pt reports toes to left foot swelling, oozing and smell x 1 week, denies pain, has neuropathy       History provided by:  Patient   used: No     53-year-old female with a history of peripheral neuropathy, bilateral foot drop presented with worsening erythema, swelling and foul smell of her left toes  Stated that she was sick nor ring the symptoms because she was on vacation  She has no sensation of pain in that foot  No history of foot infection in the past   On exam all the toes are erythematous, swollen with devitalized tissue on the plantar aspect and up the lateral aspect of the foot  Foot is somewhat erythematous and edematous  Patient has had no fever, chills or other systemic symptoms  Vitals normal here  Plan labs including cultures, foot x-ray to rule out osteomyelitis an IV antibiotics  Will plan admission, podiatry consultation  Prior to Admission Medications   Prescriptions Last Dose Informant Patient Reported? Taking? DULoxetine (CYMBALTA) 20 mg capsule   No No   Sig: Take 1 capsule (20 mg total) by mouth 2 (two) times a day   Patient taking differently: Take 20 mg by mouth daily     OXcarbazepine (TRILEPTAL) 150 mg tablet   Yes No   Sig: Take 150 mg by mouth 2 (two) times a day   QUEtiapine (SEROquel) 25 mg tablet   Yes Yes   Sig: Take 25 mg by mouth daily at bedtime   nystatin (MYCOSTATIN) powder   Yes No   Sig: Apply topically 2 (two) times a day   pravastatin (PRAVACHOL) 40 mg tablet   Yes No   Sig: Take 40 mg by mouth every other day      Facility-Administered Medications: None       Past Medical History:   Diagnosis Date    Bilateral foot-drop     Guillermo-Danlos syndrome     Hyperlipidemia     Neuropathy     Spinal stenosis        Past Surgical History:   Procedure Laterality Date     SECTION      x 2    X-STOP IMPLANTATION      2017       History reviewed   No pertinent family history  I have reviewed and agree with the history as documented  Social History   Substance Use Topics    Smoking status: Former Smoker    Smokeless tobacco: Never Used    Alcohol use No        Review of Systems   Respiratory: Negative for chest tightness and shortness of breath  Cardiovascular: Positive for leg swelling  Musculoskeletal: Negative for back pain and neck pain  Skin: Positive for color change and wound  Neurological: Negative for dizziness and weakness  All other systems reviewed and are negative  Physical Exam  Physical Exam   Constitutional: She appears well-developed and well-nourished  No distress  HENT:   Head: Normocephalic  Mouth/Throat: Oropharynx is clear and moist    Neck: Normal range of motion  Neck supple  Cardiovascular: Normal rate and regular rhythm  Pulmonary/Chest: Effort normal  No respiratory distress  Abdominal: Soft  She exhibits no distension  Musculoskeletal:   Edema of the left foot with erythema of toes with devitalized tissue on the plantar aspect and up the lateral aspect of the foot  Skin: Skin is warm and dry  Psychiatric: She has a normal mood and affect  Her behavior is normal    Nursing note and vitals reviewed        Vital Signs  ED Triage Vitals [06/30/18 1553]   Temperature Pulse Respirations Blood Pressure SpO2   99 °F (37 2 °C) 83 18 161/78 98 %      Temp Source Heart Rate Source Patient Position - Orthostatic VS BP Location FiO2 (%)   Oral Monitor Sitting Left arm --      Pain Score       No Pain           Vitals:    06/30/18 1553 06/30/18 1742 06/30/18 1923 06/30/18 2234   BP: 161/78 124/72 118/59 135/62   Pulse: 83 84 81 79   Patient Position - Orthostatic VS: Sitting Lying Lying Lying       Visual Acuity      ED Medications  Medications   nystatin (MYCOSTATIN) powder ( Topical Not Given 6/30/18 2155)   OXcarbazepine (TRILEPTAL) tablet 150 mg (150 mg Oral Given 6/30/18 2149)   docusate sodium (COLACE) capsule 100 mg (not administered)   ondansetron (ZOFRAN) injection 4 mg (not administered)   calcium carbonate (TUMS) chewable tablet 1,000 mg (not administered)   enoxaparin (LOVENOX) subcutaneous injection 40 mg (not administered)   acetaminophen (TYLENOL) tablet 650 mg (not administered)   ceFAZolin (ANCEF) IVPB (premix) 2,000 mg (not administered)   metroNIDAZOLE (FLAGYL) IVPB (premix) 500 mg (500 mg Intravenous New Bag 6/30/18 2151)   pravastatin (PRAVACHOL) tablet 40 mg (not administered)   DULoxetine (CYMBALTA) delayed release capsule 20 mg (not administered)   QUEtiapine (SEROquel) tablet 25 mg (25 mg Oral Given 6/30/18 2150)   cefepime (MAXIPIME) 2 g/50 mL dextrose IVPB (2,000 mg Intravenous New Bag 6/30/18 1851)   vancomycin (VANCOCIN) 1,500 mg in sodium chloride 0 9 % 250 mL IVPB (1,500 mg Intravenous New Bag 6/30/18 1957)       Diagnostic Studies  Results Reviewed     Procedure Component Value Units Date/Time    Wound culture and Gram stain [[id number]] Collected:  06/30/18 2018    Lab Status: In process Specimen:  Wound from Toe, Left Updated:  06/30/18 2021    Blood culture #1 [[id number]] Collected:  06/30/18 1833    Lab Status: In process Specimen:  Blood from Arm, Left Updated:  06/30/18 1848    Lactic Acid x2 [[id number]]  (Normal) Collected:  06/30/18 1646    Lab Status:  Final result Specimen:  Blood from Hand, Left Updated:  06/30/18 1723     LACTIC ACID 0 9 mmol/L     Narrative:         Result may be elevated if tourniquet was used during collection      Comprehensive metabolic panel [[id number]]  (Abnormal) Collected:  06/30/18 1646    Lab Status:  Final result Specimen:  Blood from Hand, Left Updated:  06/30/18 1721     Sodium 139 mmol/L      Potassium 4 1 mmol/L      Chloride 101 mmol/L      CO2 31 mmol/L      Anion Gap 7 mmol/L      BUN 18 mg/dL      Creatinine 0 52 (L) mg/dL      Glucose 89 mg/dL      Calcium 9 7 mg/dL      AST 19 U/L      ALT 23 U/L      Alkaline Phosphatase 105 U/L      Total Protein 7 7 g/dL Albumin 3 6 g/dL      Total Bilirubin 0 30 mg/dL      eGFR 107 ml/min/1 73sq m     Narrative:         National Kidney Disease Education Program recommendations are as follows:  GFR calculation is accurate only with a steady state creatinine  Chronic Kidney disease less than 60 ml/min/1 73 sq  meters  Kidney failure less than 15 ml/min/1 73 sq  meters  APTT [[id number]]  (Abnormal) Collected:  06/30/18 1646    Lab Status:  Final result Specimen:  Blood from Arm, Left Updated:  06/30/18 1716     PTT 39 (H) seconds     Protime-INR [[id number]]  (Abnormal) Collected:  06/30/18 1646    Lab Status:  Final result Specimen:  Blood from Arm, Left Updated:  06/30/18 1715     Protime 11 7 (L) seconds      INR 0 88    CBC and differential [[id number]]  (Normal) Collected:  06/30/18 1646    Lab Status:  Final result Specimen:  Blood from Hand, Left Updated:  06/30/18 1659     WBC 8 77 Thousand/uL      RBC 4 34 Million/uL      Hemoglobin 13 6 g/dL      Hematocrit 40 5 %      MCV 93 fL      MCH 31 3 pg      MCHC 33 6 g/dL      RDW 13 4 %      MPV 10 5 fL      Platelets 007 Thousands/uL      Neutrophils Relative 65 %      Lymphocytes Relative 24 %      Monocytes Relative 7 %      Eosinophils Relative 3 %      Basophils Relative 1 %      Neutrophils Absolute 5 69 Thousands/µL      Lymphocytes Absolute 2 11 Thousands/µL      Monocytes Absolute 0 65 Thousand/µL      Eosinophils Absolute 0 24 Thousand/µL      Basophils Absolute 0 08 Thousands/µL     Blood culture #2 [[id number]] Collected:  06/30/18 1646    Lab Status: In process Specimen:  Blood from Hand, Left Updated:  06/30/18 1658                 XR foot 3+ views LEFT   Final Result by Anai Macdonald MD (06/30 1959)      No definite bony destruction to confirm osteomyelitis radiographically  Further clinical and imaging follow-up recommended              Workstation performed: [id number]                    Procedures  Procedures       Phone Contacts  ED Phone Contact    ED Course MDM  Number of Diagnoses or Management Options  Cellulitis of fifth toe, left:   Cellulitis of fourth toe of left foot:   Cellulitis of great toe, left:   Cellulitis of second toe of left foot:   Cellulitis of third toe of left foot:   Diagnosis management comments: 72-year-old female with cellulitis and devitalized tissue of the left toes and lateral foot  History of neuropathy without pain sensation  No systemic symptoms  Admitted for IV antibiotics and podiatry consultation  Possible osteomyelitis of the 5th toe on x-ray         Amount and/or Complexity of Data Reviewed  Clinical lab tests: ordered and reviewed  Tests in the radiology section of CPT®: ordered and reviewed  Discuss the patient with other providers: yes  Independent visualization of images, tracings, or specimens: yes    Patient Progress  Patient progress: stable    CritCare Time    Disposition  Final diagnoses:   Cellulitis of fifth toe, left   Cellulitis of fourth toe of left foot   Cellulitis of third toe of left foot   Cellulitis of second toe of left foot   Cellulitis of great toe, left     Time reflects when diagnosis was documented in both MDM as applicable and the Disposition within this note     Time User Action Codes Description Comment    6/30/2018  5:54 PM Adenike Fregoso J Add [L03 032] Cellulitis of fifth toe, left     6/30/2018  5:54 PM Vishnu Uptons J Add [L03 032] Cellulitis of fourth toe of left foot     6/30/2018  5:54 PM Valere Schlatter Add [L03 032] Cellulitis of third toe of left foot     6/30/2018  5:55 PM Valere Schlatter Add [L03 032] Cellulitis of second toe of left foot     6/30/2018  5:55 PM Valnigel Schlatter Add [L03 032] Cellulitis of great toe, left     6/30/2018  6:06 PM Margarita Blue Add [Q79 6] Guillermo-Danlos disease     6/30/2018  6:06 PM Margarita Blue Modify [Q79 6] Guillermo-Danlos disease     6/30/2018  6:34 PM Margarita Blue Add [L03 116] Cellulitis of left lower extremity     6/30/2018  6:34 PM Margarita Blue [L03 116] Cellulitis of left lower extremity       ED Disposition     ED Disposition Condition Comment    Admit  Case was discussed with Dr Edgar Ramos and the patient's admission status was agreed to be Admission Status: inpatient status to the service of Dr Edgar Ramos   Follow-up Information    None         Current Discharge Medication List      CONTINUE these medications which have NOT CHANGED    Details   QUEtiapine (SEROquel) 25 mg tablet Take 25 mg by mouth daily at bedtime      DULoxetine (CYMBALTA) 20 mg capsule Take 1 capsule (20 mg total) by mouth 2 (two) times a day  Qty: 60 capsule, Refills: 3    Associated Diagnoses: Depression      nystatin (MYCOSTATIN) powder Apply topically 2 (two) times a day      OXcarbazepine (TRILEPTAL) 150 mg tablet Take 150 mg by mouth 2 (two) times a day      pravastatin (PRAVACHOL) 40 mg tablet Take 40 mg by mouth every other day           No discharge procedures on file      ED Provider  Electronically Signed by           Tanvi Cantu MD  06/30/18 1907

## 2018-07-01 ENCOUNTER — APPOINTMENT (INPATIENT)
Dept: ULTRASOUND IMAGING | Facility: HOSPITAL | Age: 58
DRG: 603 | End: 2018-07-01
Payer: COMMERCIAL

## 2018-07-01 LAB
ANION GAP SERPL CALCULATED.3IONS-SCNC: 9 MMOL/L (ref 4–13)
BUN SERPL-MCNC: 21 MG/DL (ref 5–25)
CALCIUM SERPL-MCNC: 8.8 MG/DL (ref 8.3–10.1)
CHLORIDE SERPL-SCNC: 105 MMOL/L (ref 100–108)
CO2 SERPL-SCNC: 28 MMOL/L (ref 21–32)
CREAT SERPL-MCNC: 0.53 MG/DL (ref 0.6–1.3)
CRP SERPL QL: 30 MG/L
ERYTHROCYTE [DISTWIDTH] IN BLOOD BY AUTOMATED COUNT: 13.6 % (ref 11.6–15.1)
ERYTHROCYTE [SEDIMENTATION RATE] IN BLOOD: 36 MM/HOUR (ref 0–20)
GFR SERPL CREATININE-BSD FRML MDRD: 106 ML/MIN/1.73SQ M
GLUCOSE SERPL-MCNC: 95 MG/DL (ref 65–140)
HCT VFR BLD AUTO: 39.2 % (ref 34.8–46.1)
HGB BLD-MCNC: 12.8 G/DL (ref 11.5–15.4)
MAGNESIUM SERPL-MCNC: 2.1 MG/DL (ref 1.6–2.6)
MCH RBC QN AUTO: 30.8 PG (ref 26.8–34.3)
MCHC RBC AUTO-ENTMCNC: 32.7 G/DL (ref 31.4–37.4)
MCV RBC AUTO: 94 FL (ref 82–98)
PLATELET # BLD AUTO: 245 THOUSANDS/UL (ref 149–390)
PMV BLD AUTO: 10.6 FL (ref 8.9–12.7)
POTASSIUM SERPL-SCNC: 3.9 MMOL/L (ref 3.5–5.3)
RBC # BLD AUTO: 4.16 MILLION/UL (ref 3.81–5.12)
SODIUM SERPL-SCNC: 142 MMOL/L (ref 136–145)
WBC # BLD AUTO: 7.8 THOUSAND/UL (ref 4.31–10.16)

## 2018-07-01 PROCEDURE — 83735 ASSAY OF MAGNESIUM: CPT | Performed by: INTERNAL MEDICINE

## 2018-07-01 PROCEDURE — 86140 C-REACTIVE PROTEIN: CPT | Performed by: PODIATRIST

## 2018-07-01 PROCEDURE — 99232 SBSQ HOSP IP/OBS MODERATE 35: CPT | Performed by: INTERNAL MEDICINE

## 2018-07-01 PROCEDURE — 85027 COMPLETE CBC AUTOMATED: CPT | Performed by: INTERNAL MEDICINE

## 2018-07-01 PROCEDURE — 93923 UPR/LXTR ART STDY 3+ LVLS: CPT

## 2018-07-01 PROCEDURE — 80048 BASIC METABOLIC PNL TOTAL CA: CPT | Performed by: INTERNAL MEDICINE

## 2018-07-01 PROCEDURE — 85652 RBC SED RATE AUTOMATED: CPT | Performed by: PODIATRIST

## 2018-07-01 PROCEDURE — 99222 1ST HOSP IP/OBS MODERATE 55: CPT | Performed by: INTERNAL MEDICINE

## 2018-07-01 RX ORDER — PRAVASTATIN SODIUM 40 MG
40 TABLET ORAL EVERY OTHER DAY
Status: DISCONTINUED | OUTPATIENT
Start: 2018-07-01 | End: 2018-07-02 | Stop reason: HOSPADM

## 2018-07-01 RX ADMIN — METRONIDAZOLE 500 MG: 500 INJECTION, SOLUTION INTRAVENOUS at 06:17

## 2018-07-01 RX ADMIN — ENOXAPARIN SODIUM 40 MG: 100 INJECTION SUBCUTANEOUS at 08:26

## 2018-07-01 RX ADMIN — QUETIAPINE FUMARATE 25 MG: 25 TABLET ORAL at 21:45

## 2018-07-01 RX ADMIN — DULOXETINE 20 MG: 20 CAPSULE, DELAYED RELEASE ORAL at 08:26

## 2018-07-01 RX ADMIN — CEFAZOLIN SODIUM 2000 MG: 2 SOLUTION INTRAVENOUS at 13:53

## 2018-07-01 RX ADMIN — OXCARBAZEPINE 150 MG: 150 TABLET ORAL at 17:47

## 2018-07-01 RX ADMIN — OXCARBAZEPINE 150 MG: 150 TABLET ORAL at 08:26

## 2018-07-01 RX ADMIN — CEFAZOLIN SODIUM 2000 MG: 2 SOLUTION INTRAVENOUS at 21:45

## 2018-07-01 RX ADMIN — CEFAZOLIN SODIUM 2000 MG: 2 SOLUTION INTRAVENOUS at 05:27

## 2018-07-01 RX ADMIN — PRAVASTATIN SODIUM 40 MG: 40 TABLET ORAL at 21:45

## 2018-07-01 NOTE — PROGRESS NOTES
[facility] Medicine Progress Note  Patient: Florencia Kendrick 66 y o  female   MRN: [id number]  PCP: Davi Cagle DO  Unit/Bed#: -01 Encounter: [id number]  Date Of Visit: 18    Assessment:    Principal Problem:    Cellulitis of left lower extremity  Active Problems:    Guillermo-Danlos disease    Hyperlipidemia    Depression, major, single episode, severe (HCC)    Anxiety disorder    Toe infection    History of tibial fracture      Plan:    · Left foot cellulitis:  · Markedly improved with antibiotics  · Continue cefazolin  · Change to p o  Keflex at the time of discharge  · Open wounds and abrasion all digits of left foot:  · Podiatry evaluation  · Wound care per Podiatry  · Peripheral neuropathy due to Ehler-Danlos  · Supportive care  · Continue Trileptal  · Anxiety and depression  · Cymbalta  · Hyperlipidemia on statin       VTE Pharmacologic Prophylaxis:   Pharmacologic: Enoxaparin (Lovenox)  Mechanical VTE Prophylaxis in Place: Yes    Patient Centered Rounds: I have performed bedside rounds with nursing staff today  Discussions with Specialists or Other Care Team Provider:  Podiatry    Education and Discussions with Family / Patient:   at bedside    Time Spent for Care: 20 minutes  More than 50% of total time spent on counseling and coordination of care as described above  Current Length of Stay: 1 day(s)    Current Patient Status: Inpatient   Certification Statement: The patient will continue to require additional inpatient hospital stay due to IV antibiotics    Discharge Plan / Estimated Discharge Date:  Patient wishes to go home tomorrow    Code Status: Level 1 - Full Code      Subjective:   No events overnight  Objective:     Vitals:   Temp (24hrs), Av 5 °F (36 9 °C), Min:98 2 °F (36 8 °C), Max:99 °F (37 2 °C)    HR:  [79-84] 81  Resp:  [16-20] 18  BP: (118-161)/(59-78) 132/67  SpO2:  [95 %-99 %] 99 %  Body mass index is 33 04 kg/m²       Input and Output Summary (last 24 hours): Intake/Output Summary (Last 24 hours) at 07/01/18 1404  Last data filed at 07/01/18 0656   Gross per 24 hour   Intake              480 ml   Output                0 ml   Net              480 ml       Physical Exam:     Physical Exam     Gen -Patient comfortable   Neck- Supple  No thyromegaly or lymphadenopathy  Lungs-Clear bilaterally without any wheeze or rales   Heart S1-S2, regular rate and rhythm, no murmurs  Abdomen-soft nontender, no organomegaly  Bowel sounds present  Extremities-no cyanosi,  Clubbing:  Left foot edema, erythema resolved  Abrasions and open wounds all digits left foot  Skin- no rash  Neuro-nonfocal         Additional Data:     Labs:      Results from last 7 days  Lab Units 07/01/18  0406 06/30/18  1646   WBC Thousand/uL 7 80 8 77   HEMOGLOBIN g/dL 12 8 13 6   HEMATOCRIT % 39 2 40 5   PLATELETS Thousands/uL 245 267   NEUTROS PCT %  --  65   LYMPHS PCT %  --  24   MONOS PCT %  --  7   EOS PCT %  --  3       Results from last 7 days  Lab Units 07/01/18  0406 06/30/18  1646   SODIUM mmol/L 142 139   POTASSIUM mmol/L 3 9 4 1   CHLORIDE mmol/L 105 101   CO2 mmol/L 28 31   BUN mg/dL 21 18   CREATININE mg/dL 0 53* 0 52*   CALCIUM mg/dL 8 8 9 7   TOTAL PROTEIN g/dL  --  7 7   BILIRUBIN TOTAL mg/dL  --  0 30   ALK PHOS U/L  --  105   ALT U/L  --  23   AST U/L  --  19   GLUCOSE RANDOM mg/dL 95 89       Results from last 7 days  Lab Units 06/30/18  1646   INR  0 88       * I Have Reviewed All Lab Data Listed Above  * Additional Pertinent Lab Tests Reviewed:  All Labs Within Last 24 Hours Reviewed    Imaging:    Imaging Reports Reviewed Today Include:   Imaging Personally Reviewed by Myself Includes:      Recent Cultures (last 7 days):           Last 24 Hours Medication List:     Current Facility-Administered Medications:  acetaminophen 650 mg Oral Q6H PRN Margarita Blue MD    calcium carbonate 1,000 mg Oral Daily PRN Margarita Blue MD    cefazolin 2,000 mg Intravenous Q8H Margarita Blue MD Last Rate: 2,000 mg (07/01/18 1353)   docusate sodium 100 mg Oral BID PRN Margarita Blue MD    DULoxetine 20 mg Oral Daily Irma Hess PA-C    enoxaparin 40 mg Subcutaneous Daily Margarita Blue MD    nystatin  Topical BID Margarita Blue MD    ondansetron 4 mg Intravenous Q6H PRN Margarita Blue MD    OXcarbazepine 150 mg Oral BID Margarita Blue MD    [START ON 7/2/2018] pravastatin 40 mg Oral Every Other Day Margarita Blue MD    QUEtiapine 25 mg Oral HS Irma Hess PA-C         Today, Patient Was Seen By: Yaw Burnham MD    ** Please Note: This note has been constructed using a voice recognition system   **

## 2018-07-01 NOTE — CASE MANAGEMENT
Initial Clinical Review    Admission: Date/Time/Statement: 6/30/18 @ 1759 INPATIENT    Orders Placed This Encounter   Procedures    Inpatient Admission (expected length of stay for this patient is greater than two midnights)     Standing Status:   Standing     Number of Occurrences:   1     Order Specific Question:   Admitting Physician     Answer:   Keely Watkins     Order Specific Question:   Level of Care     Answer:   Med Surg [16]     Order Specific Question:   Estimated length of stay     Answer:   More than 2 Midnights     Order Specific Question:   Certification     Answer:   I certify that inpatient services are medically necessary for this patient for a duration of greater than two midnights  See H&P and MD Progress Notes for additional information about the patient's course of treatment  ED: Date/Time/Mode of Arrival:   ED Arrival Information     Expected Arrival Acuity Means of Arrival Escorted By Service Admission Type    - 6/30/2018 15:44 Urgent Walk-In Family Member General Medicine Urgent    Arrival Complaint    LT FOOT PROBLEM          Chief Complaint:   Chief Complaint   Patient presents with    Toe Swelling     pt reports toes to left foot swelling, oozing and smell x 1 week, denies pain, has neuropathy       History of Illness:    Soheila You is a 66 y o  female who presents with left foot swelling, and foul smell for 1 week  Yesterday she  went to urgent care clinic but was told to go to ED because of infection  ED Vital Signs:   ED Triage Vitals [06/30/18 1553]   Temperature Pulse Respirations Blood Pressure SpO2   99 °F (37 2 °C) 83 18 161/78 98 %   Pain Score       No Pain        Wt Readings from Last 1 Encounters:   06/30/18 95 7 kg (210 lb 15 7 oz)       Vital Signs: WNL    Abnormal Labs/Diagnostic Test Results:     X-ray L foot: no definite bony destruction to confirm osteomyelitis  Further clinical and imaging follow up recommended      Vascular study (pending)    ED Treatment:   Medication Administration from 06/30/2018 1544 to 06/30/2018 1913       Date/Time Order Dose Route Action     06/30/2018 1851 cefepime (MAXIPIME) 2 g/50 mL dextrose IVPB 2,000 mg Intravenous New Bag          Past Medical/Surgical History: Active Ambulatory Problems     Diagnosis Date Noted    Overdose 04/04/2018    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    Hyperlipidemia 04/04/2018    Prediabetes 04/04/2018    Depression, major, single episode, severe ([facility] [address] ) 05/18/2018    Anxiety disorder 05/18/2018     Past Medical History:   Diagnosis Date    Bilateral foot-drop     Guillermo-Danlos syndrome     Hyperlipidemia     Neuropathy     Spinal stenosis        Admitting Diagnosis: Guillermo-Danlos disease [Q79 6]  Toe swelling [M79 89]  Cellulitis of fifth toe, left [L03 032]  Cellulitis of fourth toe of left foot [L03 032]  Cellulitis of great toe, left [L03 032]  Cellulitis of second toe of left foot [L03 032]  Cellulitis of third toe of left foot [L03 032]  Cellulitis of left lower extremity [L03 116]    Age/Sex: 66 y o  female    Assessment/Plan:        Hospital Problem List:      Principal Problem:    Cellulitis of left lower extremity  Active Problems:    Guillermo-Danlos disease    Hyperlipidemia    Depression, major, single episode, severe ([facility])    Anxiety disorder    Toe infection    History of tibial fracture        Plan for the Primary Problem(s):  · Cellulitis left lower extremity and toes/possible osteomyelitis  ? Received cefepime and vancomycin in ED  ? Will obtain cultures from the draining wounds of dose  ? Will continue with Ancef and Flagyl  ? Consult ID and Podiatry     Plan for Additional Problems:   · Guillermo-Danlos disease with neuropathy  ? Supportive care  ? Continue Trileptal  · ? Pre diabetes  ?  Check A1c  · History of left tibial fracture status post IM nail     VTE Prophylaxis: Enoxaparin (Lovenox)  / sequential compression device        Anticipated Length of Stay: Patient will be admitted on an Inpatient basis with an anticipated length of stay of  > 2 midnights  Justification for Hospital Stay:  IV antibiotics      Admission Orders:    Podiatry consult  Infectious Disease consult  Blood and wound cultures  Continuous cardiac monitoring  Up with assistance/sequential compression device  Surgical shoe to affected extremity  PT eval and treat        Scheduled Meds:   Current Facility-Administered Medications:  acetaminophen 650 mg Oral Q6H PRN   calcium carbonate 1,000 mg Oral Daily PRN   cefazolin 2,000 mg Intravenous Q8H   docusate sodium 100 mg Oral BID PRN   DULoxetine 20 mg Oral Daily   enoxaparin 40 mg Subcutaneous Daily   nystatin  Topical BID   ondansetron 4 mg Intravenous Q6H PRN   OXcarbazepine 150 mg Oral BID   [START ON 7/2/2018] pravastatin 40 mg Oral Every Other Day   QUEtiapine 25 mg Oral HS             Thank you,  Pa Arevalo  [facility] Utilization Review Department  Phone: [phone]; Fax [phone]  ATTENTION: The Network Utilization Review Department is now centralized for our 9 Facilities  Make a note that we have a new phone and fax numbers for our Department  Please call with any questions or concerns to [phone] and carefully follow the prompts so that you are directed to the right person  All voicemails are confidential  Fax any determinations, approvals, denials, and requests for initial or continue stay review clinical to [phone]  Due to HIGH CALL volume, it would be easier if you could please send faxed requests to expedite your requests and in part, help us provide discharge notifications faster

## 2018-07-01 NOTE — CONSULTS
Consult - Yuliana Gutierrez 66 y o  female MRN: [id number]  Unit/Bed#: -01 Encounter: [id number]        ASSESSMENT:  1  Cellulitis left foot  2  Multiple,superficial skin excoriation / wounds all digits left foot  3  Peripheral neuropathy      PLAN:  Wounds look fairly superficial without clinical or radiographical evidence of abscess or osteomyelitis  Will dry out the wounds with betadine  I think there are some fungal elements and will consider topical antifungal agents once her wounds are stable and dry  Will continue to monitor the progress with IV antibiotics  Will also order arterial study concerning decreased pedal pulses  Order surgical shoe for off-loading of toes  History of Present Illness     David Albert is a 66 y o  female who consulted for foot infection  She started wounds / drainage on her toes left foot about 1 week ago when she was on vacation  Increased redness, swelling, and drainage to her left toes and came to ED last night  No pain on left foot due to neuropathy  She thinks her toes were rubbing inside her shoe  She is ambulatory with MAFO and walker  She denies any constitutional signs of infection at this time  Inpatient consult to Podiatry  Consult performed by: Estephania Limber  Consult ordered by: Oj Yoon        Review of Systems   Constitutional: Negative  HENT: Negative  Eyes: Negative  Respiratory: Negative  Cardiovascular: Negative  Gastrointestinal: Negative  Musculoskeletal: See HPI   Skin: See HPI   Neurological: See HPI   Psych: negative         Historical Information   Past Medical History:   Diagnosis Date    Bilateral foot-drop     Guillermo-Danlos syndrome     Hyperlipidemia     Neuropathy     Spinal stenosis      Past Surgical History:   Procedure Laterality Date     SECTION      x 2    X-STOP IMPLANTATION      2017     Social History   History   Alcohol Use No     History   Drug Use No     History Smoking Status    Former Smoker   Smokeless Tobacco    Never Used     Family History: History reviewed  No pertinent family history  Meds/Allergies   Prescriptions Prior to Admission   Medication    QUEtiapine (SEROquel) 25 mg tablet    DULoxetine (CYMBALTA) 20 mg capsule    nystatin (MYCOSTATIN) powder    OXcarbazepine (TRILEPTAL) 150 mg tablet    pravastatin (PRAVACHOL) 40 mg tablet     Allergies   Allergen Reactions    Ciprofloxacin        Objective   First Vitals:   Blood Pressure: 161/78 (06/30/18 1553)  Pulse: 83 (06/30/18 1553)  Temperature: 99 °F (37 2 °C) (06/30/18 1553)  Temp Source: Oral (06/30/18 1553)  Respirations: 18 (06/30/18 1553)  Height: 5' 7" (170 2 cm) (06/30/18 1923)  Weight - Scale: 95 7 kg (210 lb 15 7 oz) (06/30/18 1923)  SpO2: 98 % (06/30/18 1553)    Current Vitals:   Blood Pressure: 132/67 (07/01/18 0656)  Pulse: 81 (07/01/18 0656)  Temperature: 98 2 °F (36 8 °C) (07/01/18 0656)  Temp Source: Oral (07/01/18 0656)  Respirations: 18 (07/01/18 0656)  Height: 5' 7" (170 2 cm) (06/30/18 1923)  Weight - Scale: 95 7 kg (210 lb 15 7 oz) (06/30/18 1923)  SpO2: 99 % (07/01/18 0656)        /67 (BP Location: Right arm)   Pulse 81   Temp 98 2 °F (36 8 °C) (Oral)   Resp 18   Ht 5' 7" (1 702 m)   Wt 95 7 kg (210 lb 15 7 oz)   SpO2 99%   BMI 33 04 kg/m²      General Appearance:    Alert, cooperative, no distress   Head:    Normocephalic, without obvious abnormality, atraumatic           Neck:   Supple, symmetrical, trachea midline   Back:     Symmetric   Lungs:     Respirations unlabored   Heart:    Regular rate and rhythm   Abdomen:     Soft, non-tender   Extremities:   Chronic LE edema, left worse than right  Equinovarus foot  Pulses:   Palpable DP right  Non-palpable left DP, and bilateral PTA  CRT is WNL all digits  Skin:   Multiple superficial wounds/ skin excoriation/ maceration on digits left foot with localized redness  No purulence noted       Neurologic: Cutaneous sensation is absent  AAO X 3  Foot drop bilaterally  Lab Results:   Admission on 06/30/2018   Component Date Value    PTT 06/30/2018 39*    Protime 06/30/2018 11 7*    INR 06/30/2018 0 88     WBC 06/30/2018 8 77     RBC 06/30/2018 4 34     Hemoglobin 06/30/2018 13 6     Hematocrit 06/30/2018 40 5     MCV 06/30/2018 93     MCH 06/30/2018 31 3     MCHC 06/30/2018 33 6     RDW 06/30/2018 13 4     MPV 06/30/2018 10 5     Platelets 11/53/8295 267     Neutrophils Relative 06/30/2018 65     Lymphocytes Relative 06/30/2018 24     Monocytes Relative 06/30/2018 7     Eosinophils Relative 06/30/2018 3     Basophils Relative 06/30/2018 1     Neutrophils Absolute 06/30/2018 5 69     Lymphocytes Absolute 06/30/2018 2 11     Monocytes Absolute 06/30/2018 0 65     Eosinophils Absolute 06/30/2018 0 24     Basophils Absolute 06/30/2018 0 08     Sodium 06/30/2018 139     Potassium 06/30/2018 4 1     Chloride 06/30/2018 101     CO2 06/30/2018 31     Anion Gap 06/30/2018 7     BUN 06/30/2018 18     Creatinine 06/30/2018 0 52*    Glucose 06/30/2018 89     Calcium 06/30/2018 9 7     AST 06/30/2018 19     ALT 06/30/2018 23     Alkaline Phosphatase 06/30/2018 105     Total Protein 06/30/2018 7 7     Albumin 06/30/2018 3 6     Total Bilirubin 06/30/2018 0 30     eGFR 06/30/2018 107     LACTIC ACID 06/30/2018 0 9     Extra Tube 06/30/2018 Hold for add-ons       Sodium 07/01/2018 142     Potassium 07/01/2018 3 9     Chloride 07/01/2018 105     CO2 07/01/2018 28     Anion Gap 07/01/2018 9     BUN 07/01/2018 21     Creatinine 07/01/2018 0 53*    Glucose 07/01/2018 95     Calcium 07/01/2018 8 8     eGFR 07/01/2018 106     Magnesium 07/01/2018 2 1     WBC 07/01/2018 7 80     RBC 07/01/2018 4 16     Hemoglobin 07/01/2018 12 8     Hematocrit 07/01/2018 39 2     MCV 07/01/2018 94     MCH 07/01/2018 30 8     MCHC 07/01/2018 32 7     RDW 07/01/2018 13 6     Platelets 07/01/2018 245     MPV 07/01/2018 10 6                Imaging: I have personally reviewed pertinent films in PACS  EKG, Pathology, labs, and Other Studies: I have personally reviewed pertinent reports        Code Status: Level 1 - Full Code

## 2018-07-01 NOTE — CONSULTS
Consultation - Infectious Disease   Val Rivera 66 y o  female MRN: [id number]  Unit/Bed#: -01 Encounter: [id number]      IMPRESSION & RECOMMENDATIONS:   Impression/Recommendations:  1  Left foot cellulitis  Possibly due to ?burn injury in setting of neuropathy  Markedly improved based on initial description  Suspect Strep based on appearance  Xray negative for osteomyelitis  Clinically stable without sepsis  Rec:  · OK from ID for D/C home on Keflex to complete 5 days total of antibiotics through 7/4/18  · Continue [address] per podiatry with close outpatient follow up  · Follow up blood cultures from admission but suspect these will remain negaitve    2  Peripheral neuropathy  Due to [facility]    3  [facility]  With bilateral foot deformities    Discussed in detail with the patient and Dr Cisneros Fairly:  Cefazolin/Flagyl # 1  Antibiotics # 2    Thank you for this consultation  We will follow along with you  HISTORY OF PRESENT ILLNESS:  Reason for Consult: Cellulitis left foot    HPI: Val Rivera is a 66 y o  female with history of a [facility] and peripheral neuropathy presented to the emergency department yesterday with a 1 week history of left foot swelling, drainage, and malodor  Upon presentation she was noted to be afebrile with a normal heart rate  She had a normal WBC  She underwent an x-ray of the left foot which showed no osteomyelitis  She was given a dose of vancomycin and cefepime and started on cefazolin and Flagyl  We are asked to comment on further evaluation and management  She does not recall burning the foot but admits that when she is in the shower and someone flushes the toilet that the water can get quite hot and she can't necessarily feel it on her feet    REVIEW OF SYSTEMS:  Denies fevers, chills, sweats, nausea, vomiting, or diarrhea  A complete system-based review of systems is otherwise negative      PAST MEDICAL HISTORY:  Past Medical History: Diagnosis Date    Bilateral foot-drop     Guillermo-Danlos syndrome     Hyperlipidemia     Neuropathy     Spinal stenosis      Past Surgical History:   Procedure Laterality Date     SECTION      x 2    X-STOP IMPLANTATION      2017       FAMILY HISTORY:  Non-contributory    SOCIAL HISTORY:  History   Alcohol Use No     History   Drug Use No     History   Smoking Status    Former Smoker   Smokeless Tobacco    Never Used       ALLERGIES:  Allergies   Allergen Reactions    Ciprofloxacin        MEDICATIONS:  All current active medications have been reviewed  PHYSICAL EXAM:  Vitals:  HR:  [79-84] 81  Resp:  [16-20] 18  BP: (118-161)/(59-78) 132/67  SpO2:  [95 %-99 %] 99 %  Temp (24hrs), Av 5 °F (36 9 °C), Min:98 2 °F (36 8 °C), Max:99 °F (37 2 °C)  Current: Temperature: 98 2 °F (36 8 °C)     Physical Exam:  General:  Well-nourished, well-developed, in no acute distress  Eyes:  Conjunctive clear with no hemorrhages or effusions  Oropharynx:  No ulcers, no lesions  Neck:  Supple, no lymphadenopathy  Lungs:  Clear to auscultation bilaterally, no accessory muscle use  Cardiac:  Regular rate and rhythm, no murmurs  Abdomen:  Soft, non-tender, non-distended  Extremities:  No peripheral cyanosis, clubbing, or edema  Skin:  No rashes, no ulcers  Neurological:  Moves all four extremities spontaneously, sensation grossly intact  Left foot:  Internal rotation of foot  Previously prescribed cellulitis improved  Blister over 1st toe and later foot  Peeled blisters over remaining toes with yellow crusting          LABS, IMAGING, & OTHER STUDIES:  Lab Results:  I have personally reviewed pertinent labs      Results from last 7 days  Lab Units 18  0406 18  1646   SODIUM mmol/L 142 139   POTASSIUM mmol/L 3 9 4 1   CHLORIDE mmol/L 105 101   CO2 mmol/L 28 31   ANION GAP mmol/L 9 7   BUN mg/dL 21 18   CREATININE mg/dL 0 53* 0 52*   EGFR ml/min/1 73sq m 106 107   GLUCOSE RANDOM mg/dL 95 89   CALCIUM mg/dL 8 8 9 7   AST U/L  --  19   ALT U/L  --  23   ALK PHOS U/L  --  105   TOTAL PROTEIN g/dL  --  7 7   BILIRUBIN TOTAL mg/dL  --  0 30       Results from last 7 days  Lab Units 07/01/18  0406 06/30/18  1646   WBC Thousand/uL 7 80 8 77   HEMOGLOBIN g/dL 12 8 13 6   PLATELETS Thousands/uL 245 267         Blood cultures negative    Imaging Studies:   I have personally reviewed pertinent imaging study reports and images in PACS  Xray left foot no bony abnormality    EKG, Pathology, and Other Studies:   I have personally reviewed pertinent reports

## 2018-07-02 VITALS
SYSTOLIC BLOOD PRESSURE: 131 MMHG | HEART RATE: 81 BPM | RESPIRATION RATE: 18 BRPM | WEIGHT: 210.98 LBS | OXYGEN SATURATION: 95 % | HEIGHT: 67 IN | BODY MASS INDEX: 33.11 KG/M2 | DIASTOLIC BLOOD PRESSURE: 71 MMHG | TEMPERATURE: 98.2 F

## 2018-07-02 PROCEDURE — 99232 SBSQ HOSP IP/OBS MODERATE 35: CPT | Performed by: INTERNAL MEDICINE

## 2018-07-02 PROCEDURE — 99239 HOSP IP/OBS DSCHRG MGMT >30: CPT | Performed by: INTERNAL MEDICINE

## 2018-07-02 PROCEDURE — 93923 UPR/LXTR ART STDY 3+ LVLS: CPT | Performed by: SURGERY

## 2018-07-02 RX ORDER — CEPHALEXIN 250 MG/1
500 CAPSULE ORAL EVERY 6 HOURS SCHEDULED
Qty: 24 CAPSULE | Refills: 0 | Status: SHIPPED | OUTPATIENT
Start: 2018-07-02 | End: 2018-07-06

## 2018-07-02 RX ADMIN — NYSTATIN: 100000 POWDER TOPICAL at 09:22

## 2018-07-02 RX ADMIN — ENOXAPARIN SODIUM 40 MG: 100 INJECTION SUBCUTANEOUS at 09:21

## 2018-07-02 RX ADMIN — DULOXETINE 20 MG: 20 CAPSULE, DELAYED RELEASE ORAL at 09:22

## 2018-07-02 RX ADMIN — CEFAZOLIN SODIUM 2000 MG: 2 SOLUTION INTRAVENOUS at 13:18

## 2018-07-02 RX ADMIN — OXCARBAZEPINE 150 MG: 150 TABLET ORAL at 09:21

## 2018-07-02 RX ADMIN — CEFAZOLIN SODIUM 2000 MG: 2 SOLUTION INTRAVENOUS at 06:22

## 2018-07-02 NOTE — PLAN OF CARE
DISCHARGE PLANNING     Discharge to home or other facility with appropriate resources Adequate for Discharge        INFECTION - ADULT     Absence or prevention of progression during hospitalization Adequate for Discharge        Knowledge Deficit     Patient/family/caregiver demonstrates understanding of disease process, treatment plan, medications, and discharge instructions Adequate for Discharge        Potential for Falls     Patient will remain free of falls Adequate for Discharge        Prexisting or High Potential for Compromised Skin Integrity     Skin integrity is maintained or improved Adequate for Discharge        SAFETY ADULT     Maintain or return to baseline ADL function Adequate for Discharge     Maintain or return mobility status to optimal level Adequate for Discharge

## 2018-07-02 NOTE — PROGRESS NOTES
Progress Note - Infectious Disease   Val Asa 66 y o  female MRN: [id number]  Unit/Bed#: -01 Encounter: [id number]      Impression/Plan:  1  Left foot cellulitis  Possibly due to burn injury in setting of neuropathy  Patient with superficial skin excoriations to the dorsal left toes  Continues to show improvement with areas dried from Betadine  Suspect Strep based on appearance, wound gram stain is preliminarily showing rare gram positive cocci  Xray negative for osteomyelitis  Clinically stable without sepsis  Rec:  ? OK from ID for D/C home on Keflex to complete 5 days total of antibiotics through 18  ? Close outpatient follow up with podiatry     2  Peripheral neuropathy  Due to Jacinto Hammans     3   Cristian Hammreji  With bilateral foot deformities    Antibiotics:  Ancef 2 - transitioned to PO Keflex today  Antibiotics 3    Subjective:  Patient has no fever, chills, sweats; no nausea, vomiting, diarrhea; no cough, shortness of breath; no pain  She reports that she has already coordinated seeing her podiatrist as an outpatient  She is eager to go home later today  No new symptoms  Objective:  Vitals:  HR:  [79-90] 81  Resp:  [18] 18  BP: (116-131)/(56-71) 131/71  SpO2:  [95 %-96 %] 95 %  Temp (24hrs), Av 3 °F (36 8 °C), Min:98 2 °F (36 8 °C), Max:98 4 °F (36 9 °C)  Current: Temperature: 98 2 °F (36 8 °C)    Physical Exam:   General Appearance:  Alert, interactive, nontoxic, no acute distress  Throat: Oropharynx moist without lesions  Lungs:   Clear to auscultation bilaterally; no wheezes, rhonchi or rales; respirations unlabored   Heart:  RRR; no murmur, rub or gallop   Abdomen:   Soft, non-tender, non-distended, positive bowel sounds  Extremities: No clubbing or cyanosis, trace edema B/L LE   Skin: No new rashes or lesions  Dried superficial excoriations to the dorsal left toes, betadine discoloration all over top of foot, loose kerlix     Labs, Imaging, & Other studies:    All pertinent labs and imaging studies were personally reviewed    Results from last 7 days  Lab Units 07/01/18  0406 06/30/18  1646   WBC Thousand/uL 7 80 8 77   HEMOGLOBIN g/dL 12 8 13 6   PLATELETS Thousands/uL 245 267       Results from last 7 days  Lab Units 07/01/18  0406 06/30/18  1646   SODIUM mmol/L 142 139   POTASSIUM mmol/L 3 9 4 1   CHLORIDE mmol/L 105 101   CO2 mmol/L 28 31   ANION GAP mmol/L 9 7   BUN mg/dL 21 18   CREATININE mg/dL 0 53* 0 52*   EGFR ml/min/1 73sq m 106 107   GLUCOSE RANDOM mg/dL 95 89   CALCIUM mg/dL 8 8 9 7   AST U/L  --  19   ALT U/L  --  23   ALK PHOS U/L  --  105   TOTAL PROTEIN g/dL  --  7 7   BILIRUBIN TOTAL mg/dL  --  0 30       Results from last 7 days  Lab Units 06/30/18  2018 06/30/18  1833 06/30/18  1646   BLOOD CULTURE   --  No Growth at 24 hrs  No Growth at 24 hrs     GRAM STAIN RESULT  No polys seen  Rare Gram positive cocci in pairs  --   --

## 2018-07-02 NOTE — PLAN OF CARE
DISCHARGE PLANNING     Discharge to home or other facility with appropriate resources Progressing        INFECTION - ADULT     Absence or prevention of progression during hospitalization Progressing        Knowledge Deficit     Patient/family/caregiver demonstrates understanding of disease process, treatment plan, medications, and discharge instructions Progressing        Potential for Falls     Patient will remain free of falls Progressing        Prexisting or High Potential for Compromised Skin Integrity     Skin integrity is maintained or improved Progressing        SAFETY ADULT     Maintain or return to baseline ADL function Progressing     Maintain or return mobility status to optimal level Progressing

## 2018-07-02 NOTE — PROGRESS NOTES
Progress Note - Podiatry  Josefina Adan 66 y o  female MRN: [id number]  Unit/Bed#: -01 Encounter: [id number]    Assessment  1  Left foot cellulitis  2  Multiple superficial skin excoriation with wounds to all digits of left foot  3  Peripheral neuropathy with edema    Plan:  1  Cellulitis is essentially resolved  At this point patient just has nette partial-thickness wounds to the digits  Recommend continuing Betadine to dry this area out  Keep as much trauma off the toes as possible  In my opinion patient can be transitioned to oral antibiotics today or tomorrow  She can follow up with Dr Chelo Arreola in the office upon discharge  Subjective/Objective   Chief Complaint:   Chief Complaint   Patient presents with    Toe Swelling     pt reports toes to left foot swelling, oozing and smell x 1 week, denies pain, has neuropathy       Subjective: 66 y o  y/o female seen and evaluated at bedside  No acute events overnight  Denies N/F/V/SOB/CP/cough/diarrhea/constipation  Patient would like to go home  Blood pressure 131/71, pulse 81, temperature 98 2 °F (36 8 °C), temperature source Oral, resp  rate 18, height 5' 7" (1 702 m), weight 95 7 kg (210 lb 15 7 oz), SpO2 95 %  ,Body mass index is 33 04 kg/m²  Lab Results   Component Value Date    WBC 7 80 07/01/2018    HGB 12 8 07/01/2018    HCT 39 2 07/01/2018    MCV 94 07/01/2018     07/01/2018     Lab Results   Component Value Date    GLUCOSE 95 07/01/2018    CALCIUM 8 8 07/01/2018     07/01/2018    K 3 9 07/01/2018    CO2 28 07/01/2018     07/01/2018    BUN 21 07/01/2018    CREATININE 0 53 (L) 07/01/2018         Invasive Devices     Peripheral Intravenous Line            Peripheral IV 06/30/18 Left Arm 1 day                Physical Exam:   General: alert, cooperative and no distress  Lungs: Normal respiratory effort, LCTABL  Heart: regular rate and rhythm   Abdomen: soft, non-tender     Extremity:  Left foot digital maceration with superficial wounds  Minimal erythema noted today  No purulence  No malodor  Lab, Imaging and other studies:     Imaging: I have personally reviewed pertinent films in PACS  EKG, Pathology, and Other Studies: I have personally reviewed pertinent reports  Portions of the record may have been created with voice recognition software  Occasional wrong word or "sound a like" substitutions may have occurred due to the inherent limitations of voice recognition software  Read the chart carefully and recognize, using context, where substitutions have occurred

## 2018-07-02 NOTE — DISCHARGE SUMMARY
Discharge Summary - [facility] Internal Medicine    Patient Information: Analy Cardona 66 y o  female MRN: [id number]  Unit/Bed#: -01 Encounter: [id number]    Discharging Physician / Practitioner: Jose F Rincon MD  PCP: Meera Benz DO  Admission Date: 6/30/2018  Discharge Date: 07/02/18    Disposition:     Home    Reason for Admission: Toe swelling    Discharge Diagnoses:     Principal Problem:    Cellulitis of left lower extremity  Active Problems:    Guillermo-Danlos disease    Hyperlipidemia    Depression, major, single episode, severe ([address] )    Anxiety disorder    Toe infection    History of tibial fracture  Resolved Problems:    * No resolved hospital problems  *      Consultations During Hospital Stay:  ·  podiatry  ·  infectious Disease    Procedures Performed:     ·  none    Significant Findings / Test Results:     ·  x-ray foot: no osteomyelitis    Hospital Course:     Analy Cardona is a 66 y o  female patient who originally presented to the hospital on 6/30/2018 due to  Toe swelling and erythema  Patient was evaluated in the emergency department, although she did not report any trauma  Because of neuropathy it was suspected that she may have had injury to her foot  She was noted to have cellulitis of left lower extremity and was treated with IV cefazolin  She had excellent response  She is being discharged with p o  Keflex  She was encouraged to follow up with Podiatry closely for further care  She is being discharged with Keflex  Condition at Discharge: good     Discharge Day Visit / Exam:     Subjective:   Feeling better and anxious to go    Vitals: Blood Pressure: 131/71 (07/02/18 0653)  Pulse: 81 (07/02/18 0653)  Temperature: 98 2 °F (36 8 °C) (07/02/18 0653)  Temp Source: Oral (07/02/18 0653)  Respirations: 18 (07/02/18 0653)  Height: 5' 7" (170 2 cm) (06/30/18 1923)  Weight - Scale: 95 7 kg (210 lb 15 7 oz) (06/30/18 1923)  SpO2: 95 % (07/02/18 0653)  Exam:   Physical Exam Gen -Patient comfortable   Neck- Supple  No thyromegaly or lymphadenopathy  Lungs-Clear bilaterally without any wheeze or rales   Heart S1-S2, regular rate and rhythm, no murmurs  Abdomen-soft nontender, no organomegaly  Bowel sounds present  Extremities-no cyanosi,  clubbing or edema   toe dressing intact on left  Skin- no rash  Neuro-nonfocal         Discussion with Family:     Discharge instructions/Information to patient and family:   See after visit summary for information provided to patient and family  Provisions for Follow-Up Care:  See after visit summary for information related to follow-up care and any pertinent home health orders  Planned Readmission: no     Discharge Statement:  I spent 35 minutes discharging the patient  This time was spent on the day of discharge  I had direct contact with the patient on the day of discharge  Greater than 50% of the total time was spent examining patient, answering all patient questions, arranging and discussing plan of care with patient as well as directly providing post-discharge instructions  Additional time then spent on discharge activities  Discharge Medications:  See after visit summary for reconciled discharge medications provided to patient and family        ** Please Note: This note has been constructed using a voice recognition system **

## 2018-07-03 NOTE — CASE MANAGEMENT
Notification of Discharge  This is a Notification of Discharge from our facility [address]  Please be advised that this patient has been discharge from our facility  Below you will find the admission and discharge date and time including the patients disposition  PRESENTATION DATE: 6/30/2018  4:08 PM  IP ADMISSION DATE: 6/30/18 1759  DISCHARGE DATE: 7/2/2018  2:32 PM  DISPOSITION: Home/Self Care    [facility] in the [facility] by [place] Utilization Review Department  Phone: [phone]; Fax [phone]  ATTENTION: The Network Utilization Review Department is now centralized for our 9 Facilities  Make a note that we have a new phone and fax numbers for our Department  Please call with any questions or concerns to [phone] and carefully follow the prompts so that you are directed to the right person  All voicemails are confidential  Fax any determinations, approvals, denials, and requests for initial or continue stay review clinical to [phone]  Due to HIGH CALL volume, it would be easier if you could please send faxed requests to expedite your requests and in part, help us provide discharge notifications faster    Reference #[id number]

## 2018-07-04 LAB
BACTERIA WND AEROBE CULT: ABNORMAL
BACTERIA WND AEROBE CULT: ABNORMAL
GRAM STN SPEC: ABNORMAL
GRAM STN SPEC: ABNORMAL

## 2018-07-06 LAB
BACTERIA BLD CULT: NORMAL
BACTERIA BLD CULT: NORMAL

## 2018-07-11 ENCOUNTER — OFFICE VISIT (OUTPATIENT)
Dept: BEHAVIORAL/MENTAL HEALTH CLINIC | Facility: CLINIC | Age: 58
End: 2018-07-11
Payer: COMMERCIAL

## 2018-07-11 DIAGNOSIS — F32.2 DEPRESSION, MAJOR, SINGLE EPISODE, SEVERE (HCC): Primary | ICD-10-CM

## 2018-07-11 DIAGNOSIS — F41.9 ANXIETY DISORDER, UNSPECIFIED TYPE: ICD-10-CM

## 2018-07-11 PROCEDURE — 90853 GROUP PSYCHOTHERAPY: CPT | Performed by: SOCIAL WORKER

## 2018-07-11 NOTE — PSYCH
Psychotherapy Provided: Group Therapy     Length of time in session: 45 minutes    Goals addressed in session: Goal 1     Pain:      none    0    Current suicide risk : Low         Behavioral Health Treatment Plan [facility]: Diagnosis and Treatment Plan explained to Po Mares relates understanding diagnosis and is agreeable to Treatment Plan  Yes     D: Pt disclosed about her vacation and search for a new dog  She also disclosed about her health problems  Pt picked an affirmation about living her dreams and meditated for 20 minutes    A: Pt was more relaxed after her mediation      P: Pt will take care of her health problems    Intervention techniques; questioning, exploration, explanation, education, mindfulness, and practice    RTO: Wednesday, August 8, 2018 at 5 pm

## 2018-08-14 ENCOUNTER — DOCUMENTATION (OUTPATIENT)
Dept: PSYCHIATRY | Facility: CLINIC | Age: 58
End: 2018-08-14

## 2018-08-14 NOTE — PROGRESS NOTES
Assessment/Plan:      There are no diagnoses linked to this encounter  Subjective: Pt attended two mindfulness groups  She discussed her stress and learned mindfulness techniques  Patient ID: Madeline Ernst is a 66 y o  female      Outpatient Discharge Summary:   Admission Date: 5/18/18  Anya Lopez was referred by Helen Ochoa VNTOMASA  Discharge Date: 8/14/18    Discharge Diagnosis:    MDD single episode, severe and Anxiety Disorder (F32 2), unspecified (F41 9)  Treating Physician: ANGEL  Treatment Complications: NA  Presenting Problem: Interested mindfulness group and depression and anxiety support group  Course of treatment includes:    group counseling  Treatment Progress: poor  Criteria for Discharge: Pt decided to not continue with group  Aftercare recommendations include Continue seeing her therapist for individual therapy  Discharge Medications include:  Current Outpatient Prescriptions:     DULoxetine (CYMBALTA) 20 mg capsule, Take 1 capsule (20 mg total) by mouth 2 (two) times a day (Patient taking differently: Take 20 mg by mouth daily  ), Disp: 60 capsule, Rfl: 3    nystatin (MYCOSTATIN) powder, Apply topically 2 (two) times a day, Disp: , Rfl:     OXcarbazepine (TRILEPTAL) 150 mg tablet, Take 150 mg by mouth 2 (two) times a day, Disp: , Rfl:     pravastatin (PRAVACHOL) 40 mg tablet, Take 40 mg by mouth every other day, Disp: , Rfl:     QUEtiapine (SEROquel) 25 mg tablet, Take 25 mg by mouth daily at bedtime, Disp: , Rfl:     Prognosis: poor

## 2019-01-17 ENCOUNTER — ANESTHESIA EVENT (OUTPATIENT)
Dept: GASTROENTEROLOGY | Facility: HOSPITAL | Age: 59
End: 2019-01-17
Payer: COMMERCIAL

## 2019-01-18 ENCOUNTER — HOSPITAL ENCOUNTER (OUTPATIENT)
Facility: HOSPITAL | Age: 59
Setting detail: OUTPATIENT SURGERY
Discharge: HOME | End: 2019-01-18
Attending: INTERNAL MEDICINE | Admitting: INTERNAL MEDICINE
Payer: COMMERCIAL

## 2019-01-18 ENCOUNTER — ANESTHESIA (OUTPATIENT)
Dept: GASTROENTEROLOGY | Facility: HOSPITAL | Age: 59
End: 2019-01-18
Payer: COMMERCIAL

## 2019-01-18 VITALS
WEIGHT: 215 LBS | HEIGHT: 67 IN | HEART RATE: 85 BPM | DIASTOLIC BLOOD PRESSURE: 58 MMHG | TEMPERATURE: 99.4 F | SYSTOLIC BLOOD PRESSURE: 104 MMHG | BODY MASS INDEX: 33.74 KG/M2 | OXYGEN SATURATION: 97 % | RESPIRATION RATE: 20 BRPM

## 2019-01-18 RX ORDER — EPHEDRINE SULFATE 50 MG/ML
INJECTION, SOLUTION INTRAVENOUS AS NEEDED
Status: DISCONTINUED | OUTPATIENT
Start: 2019-01-18 | End: 2019-01-18 | Stop reason: SURG

## 2019-01-18 RX ORDER — SODIUM CHLORIDE 9 MG/ML
125 INJECTION, SOLUTION INTRAVENOUS CONTINUOUS
Status: DISCONTINUED | OUTPATIENT
Start: 2019-01-18 | End: 2019-01-18 | Stop reason: HOSPADM

## 2019-01-18 RX ORDER — PROPOFOL 10 MG/ML
INJECTION, EMULSION INTRAVENOUS AS NEEDED
Status: DISCONTINUED | OUTPATIENT
Start: 2019-01-18 | End: 2019-01-18 | Stop reason: SURG

## 2019-01-18 RX ADMIN — PROPOFOL 50 MG: 10 INJECTION, EMULSION INTRAVENOUS at 09:11

## 2019-01-18 RX ADMIN — PROPOFOL 150 MG: 10 INJECTION, EMULSION INTRAVENOUS at 09:04

## 2019-01-18 RX ADMIN — SODIUM CHLORIDE 125 ML/HR: 0.9 INJECTION, SOLUTION INTRAVENOUS at 08:46

## 2019-01-18 RX ADMIN — PROPOFOL 50 MG: 10 INJECTION, EMULSION INTRAVENOUS at 09:05

## 2019-01-18 RX ADMIN — EPHEDRINE SULFATE 15 MG: 50 INJECTION, SOLUTION INTRAMUSCULAR; INTRAVENOUS; SUBCUTANEOUS at 09:14

## 2019-01-18 NOTE — ANESTHESIA POSTPROCEDURE EVALUATION
Post-Op Assessment Note      CV Status:  Stable    Mental Status:  Alert and awake    Hydration Status:  Euvolemic    PONV Controlled:  Controlled    Airway Patency:  Patent    Post Op Vitals Reviewed: Yes          Staff: Anesthesiologist           /58 (01/18/19 0952)    Temp      Pulse 85 (01/18/19 0952)   Resp 20 (01/18/19 0952)    SpO2 97 % (01/18/19 0952)

## 2019-01-18 NOTE — DISCHARGE INSTRUCTIONS
Please call [phone] with any problems  Your examination today was normal   I have suggested repeat colonoscopy in 5 years  Colonoscopy   WHAT YOU NEED TO KNOW:   A colonoscopy is a procedure to examine the inside of your colon (intestine) with a scope  Polyps or tissue growths may have been removed during your colonoscopy  It is normal to feel bloated and to have some abdominal discomfort  You should be passing gas  If you have hemorrhoids or you had polyps removed, you may have a small amount of bleeding  DISCHARGE INSTRUCTIONS:   Seek care immediately if:   · You have a large amount of bright red blood in your bowel movements  · Your abdomen is hard and firm and you have severe pain  · You have sudden trouble breathing  Contact your healthcare provider if:   · You develop a rash or hives  · You have a fever within 24 hours of your procedure  · You have not had a bowel movement for 3 days after your procedure  · You have questions or concerns about your condition or care  Activity:   · Do not lift, strain, or run  for 3 days after your procedure  · Rest after your procedure  You have been given medicine to relax you  Do not  drive or make important decisions until the day after your procedure  Return to your normal activity as directed  · Relieve gas and discomfort from bloating  by lying on your right side with a heating pad on your abdomen  You may need to take short walks to help the gas move out  Eat small meals until bloating is relieved  If you had polyps removed: For 7 days after your procedure:  · Do not  take aspirin  · Do not  go on long car rides  Help prevent constipation:   · Eat a variety of healthy foods  Healthy foods include fruit, vegetables, whole-grain breads, low-fat dairy products, beans, lean meat, and fish  Ask if you need to be on a special diet  Your healthcare provider may recommend that you eat high-fiber foods such as cooked beans   Fiber helps you have regular bowel movements  · Drink liquids as directed  Adults should drink between 9 and 13 eight-ounce cups of liquid every day  Ask what amount is best for you  For most people, good liquids to drink are water, juice, and milk  · Exercise as directed  Talk to your healthcare provider about the best exercise plan for you  Exercise can help prevent constipation, decrease your blood pressure and improve your health  Follow up with your healthcare provider as directed:  Write down your questions so you remember to ask them during your visits  © 2017 [facility] Information is for End User's use only and may not be sold, redistributed or otherwise used for commercial purposes  All illustrations and images included in CareNotes® are the copyrighted property of [employer]  or Jose Sheppard  The above information is an  only  It is not intended as medical advice for individual conditions or treatments  Talk to your doctor, nurse or pharmacist before following any medical regimen to see if it is safe and effective for you

## 2019-01-18 NOTE — ANESTHESIA PREPROCEDURE EVALUATION
Review of Systems/Medical History          Cardiovascular  Hypertension ,    Pulmonary  Sleep apnea ,        GI/Hepatic       Kidney stones,        Endo/Other  Negative endo/other ROS      GYN       Hematology  Negative hematology ROS      Musculoskeletal    Comment: Guillermo-Danlos syndrome      Neurology  Negative neurology ROS      Psychology   Psychiatric history, Anxiety, Depression ,              Physical Exam    Airway    Mallampati score: I  TM Distance: >3 FB  Neck ROM: full     Dental   No notable dental hx     Cardiovascular  Rhythm: regular, Rate: normal, Cardiovascular exam normal    Pulmonary  Pulmonary exam normal     Other Findings        Anesthesia Plan  ASA Score- 2     Anesthesia Type- IV sedation with anesthesia with ASA Monitors  Additional Monitors:   Airway Plan:         Plan Factors-    Induction- intravenous  Postoperative Plan-     Informed Consent- Anesthetic plan and risks discussed with patient

## 2019-01-18 NOTE — OP NOTE
OPERATIVE REPORT  PATIENT NAME: Theresa Barrios    :  1960  MRN: [id number]  Pt Location: [facility] ROOM 01    SURGERY DATE: 2019    Surgeon(s) and Role:     Baljinder Barnhart MD - Primary    Preop Diagnosis:  Encounter for screening for malignant neoplasm of colon [Z12 11]  Family history of malignant neoplasm of digestive organ [Z80 0]  Constipation [K59 00]    Post-Op Diagnosis Codes:     * Encounter for screening for malignant neoplasm of colon [Z12 11]     * Family history of malignant neoplasm of digestive organ [Z80 0]     * Constipation [K59 00]    Procedure(s) (LRB):  COLONOSCOPY (N/A)    Specimen(s):  * No specimens in log *    Estimated Blood Loss:   Minimal    Drains:       Anesthesia Type:   IV Sedation with Anesthesia    Operative Indications:  Encounter for screening for malignant neoplasm of colon [Z12 11]  Family history of malignant neoplasm of digestive organ [Z80 0]  Constipation [K59 00]      Operative Findings:  Normal      Complications:   None    Procedure and Technique:  Colonoscope was introduced and passed to the cecum the caput cecum and ileocecal valve were seen across carefully withdrawn mucosa throughout the colon appeared normal   There is no polyps    She tolerates she had apparent complication of suggested standard screening in 5 years     I was present for the entire procedure    Patient Disposition:  hemodynamically stable    SIGNATURE: Shaheen Merritt MD  DATE: 2019  TIME: 9:25 AM

## 2019-07-09 ENCOUNTER — TELEPHONE (OUTPATIENT)
Dept: BARIATRICS | Facility: CLINIC | Age: 59
End: 2019-07-09

## 2022-06-02 ENCOUNTER — APPOINTMENT (OUTPATIENT)
Dept: LAB | Facility: CLINIC | Age: 62
End: 2022-06-02
Payer: MEDICARE

## 2022-06-02 DIAGNOSIS — E78.2 MIXED HYPERLIPIDEMIA: ICD-10-CM

## 2022-06-02 LAB
ALBUMIN SERPL BCP-MCNC: 3.9 G/DL (ref 3.5–5)
ALP SERPL-CCNC: 96 U/L (ref 34–104)
ALT SERPL W P-5'-P-CCNC: 19 U/L (ref 7–52)
ANION GAP SERPL CALCULATED.3IONS-SCNC: 6 MMOL/L (ref 4–13)
AST SERPL W P-5'-P-CCNC: 18 U/L (ref 13–39)
BILIRUB SERPL-MCNC: 0.49 MG/DL (ref 0.2–1)
BUN SERPL-MCNC: 20 MG/DL (ref 5–25)
CALCIUM SERPL-MCNC: 9.6 MG/DL (ref 8.4–10.2)
CHLORIDE SERPL-SCNC: 104 MMOL/L (ref 96–108)
CHOLEST SERPL-MCNC: 253 MG/DL
CO2 SERPL-SCNC: 30 MMOL/L (ref 21–32)
CREAT SERPL-MCNC: 0.39 MG/DL (ref 0.6–1.3)
GFR SERPL CREATININE-BSD FRML MDRD: 114 ML/MIN/1.73SQ M
GLUCOSE P FAST SERPL-MCNC: 104 MG/DL (ref 65–99)
HDLC SERPL-MCNC: 91 MG/DL
LDLC SERPL CALC-MCNC: 146 MG/DL (ref 0–100)
NONHDLC SERPL-MCNC: 162 MG/DL
POTASSIUM SERPL-SCNC: 4 MMOL/L (ref 3.5–5.3)
PROT SERPL-MCNC: 6.8 G/DL (ref 6.4–8.4)
SODIUM SERPL-SCNC: 140 MMOL/L (ref 135–147)
TRIGL SERPL-MCNC: 82 MG/DL
TSH SERPL DL<=0.05 MIU/L-ACNC: 0.69 UIU/ML (ref 0.45–4.5)

## 2022-06-02 PROCEDURE — 84443 ASSAY THYROID STIM HORMONE: CPT

## 2022-06-02 PROCEDURE — 36415 COLL VENOUS BLD VENIPUNCTURE: CPT

## 2022-06-02 PROCEDURE — 80061 LIPID PANEL: CPT

## 2022-06-02 PROCEDURE — 80053 COMPREHEN METABOLIC PANEL: CPT

## 2022-07-12 ENCOUNTER — TELEPHONE (OUTPATIENT)
Dept: LAB | Facility: HOSPITAL | Age: 62
End: 2022-07-12

## 2022-07-13 ENCOUNTER — TELEPHONE (OUTPATIENT)
Dept: LAB | Facility: HOSPITAL | Age: 62
End: 2022-07-13

## 2022-07-13 NOTE — TELEPHONE ENCOUNTER
7/13 - pt was not aware that she needed a script for bloodwork - will call her pcp and call us back to schedule

## 2022-07-15 ENCOUNTER — TELEPHONE (OUTPATIENT)
Dept: LAB | Facility: HOSPITAL | Age: 62
End: 2022-07-15

## 2022-07-15 NOTE — TELEPHONE ENCOUNTER
Nic Morales from [employer] called to state orders are now in [address]  Please call to schedule

## 2022-07-22 ENCOUNTER — APPOINTMENT (OUTPATIENT)
Dept: LAB | Facility: HOSPITAL | Age: 62
End: 2022-07-22
Payer: MEDICARE

## 2022-07-22 DIAGNOSIS — E78.5 HYPERLIPIDEMIA, UNSPECIFIED HYPERLIPIDEMIA TYPE: ICD-10-CM

## 2022-07-22 DIAGNOSIS — D64.9 ANEMIA, UNSPECIFIED TYPE: ICD-10-CM

## 2022-07-22 DIAGNOSIS — R73.03 PREDIABETES: ICD-10-CM

## 2022-07-22 DIAGNOSIS — R26.81 UNSTEADY GAIT: ICD-10-CM

## 2022-07-22 DIAGNOSIS — F32.2 DEPRESSION, MAJOR, SINGLE EPISODE, SEVERE (HCC): ICD-10-CM

## 2022-07-22 LAB
25(OH)D3 SERPL-MCNC: 24.2 NG/ML (ref 30–100)
ALBUMIN SERPL BCP-MCNC: 3.7 G/DL (ref 3.5–5)
ALP SERPL-CCNC: 100 U/L (ref 46–116)
ALT SERPL W P-5'-P-CCNC: 26 U/L (ref 12–78)
ANION GAP SERPL CALCULATED.3IONS-SCNC: 7 MMOL/L (ref 4–13)
AST SERPL W P-5'-P-CCNC: 25 U/L (ref 5–45)
BILIRUB SERPL-MCNC: 0.53 MG/DL (ref 0.2–1)
BUN SERPL-MCNC: 26 MG/DL (ref 5–25)
CALCIUM SERPL-MCNC: 9.5 MG/DL (ref 8.3–10.1)
CHLORIDE SERPL-SCNC: 103 MMOL/L (ref 96–108)
CHOLEST SERPL-MCNC: 260 MG/DL
CO2 SERPL-SCNC: 28 MMOL/L (ref 21–32)
CREAT SERPL-MCNC: 0.42 MG/DL (ref 0.6–1.3)
ERYTHROCYTE [DISTWIDTH] IN BLOOD BY AUTOMATED COUNT: 13.6 % (ref 11.6–15.1)
EST. AVERAGE GLUCOSE BLD GHB EST-MCNC: 105 MG/DL
FERRITIN SERPL-MCNC: 63 NG/ML (ref 8–388)
FOLATE SERPL-MCNC: >20 NG/ML (ref 3.1–17.5)
GFR SERPL CREATININE-BSD FRML MDRD: 111 ML/MIN/1.73SQ M
GLUCOSE SERPL-MCNC: 38 MG/DL (ref 65–140)
HBA1C MFR BLD: 5.3 %
HCT VFR BLD AUTO: 44.9 % (ref 34.8–46.1)
HDLC SERPL-MCNC: 114 MG/DL
HGB BLD-MCNC: 14.1 G/DL (ref 11.5–15.4)
IRON SATN MFR SERPL: 31 % (ref 15–50)
IRON SERPL-MCNC: 110 UG/DL (ref 50–170)
LDLC SERPL CALC-MCNC: 132 MG/DL (ref 0–100)
MCH RBC QN AUTO: 31.3 PG (ref 26.8–34.3)
MCHC RBC AUTO-ENTMCNC: 31.4 G/DL (ref 31.4–37.4)
MCV RBC AUTO: 100 FL (ref 82–98)
NONHDLC SERPL-MCNC: 146 MG/DL
PLATELET # BLD AUTO: 251 THOUSANDS/UL (ref 149–390)
PMV BLD AUTO: 10.4 FL (ref 8.9–12.7)
POTASSIUM SERPL-SCNC: 3.8 MMOL/L (ref 3.5–5.3)
PROT SERPL-MCNC: 7.5 G/DL (ref 6.4–8.4)
RBC # BLD AUTO: 4.5 MILLION/UL (ref 3.81–5.12)
SODIUM SERPL-SCNC: 138 MMOL/L (ref 135–147)
TIBC SERPL-MCNC: 354 UG/DL (ref 250–450)
TRIGL SERPL-MCNC: 72 MG/DL
TSH SERPL DL<=0.05 MIU/L-ACNC: 0.92 UIU/ML (ref 0.45–4.5)
VIT B12 SERPL-MCNC: 767 PG/ML (ref 100–900)
WBC # BLD AUTO: 5.58 THOUSAND/UL (ref 4.31–10.16)

## 2022-07-22 PROCEDURE — 80061 LIPID PANEL: CPT

## 2022-07-22 PROCEDURE — 82746 ASSAY OF FOLIC ACID SERUM: CPT

## 2022-07-22 PROCEDURE — 82728 ASSAY OF FERRITIN: CPT

## 2022-07-22 PROCEDURE — 83036 HEMOGLOBIN GLYCOSYLATED A1C: CPT

## 2022-07-22 PROCEDURE — 83550 IRON BINDING TEST: CPT

## 2022-07-22 PROCEDURE — 84443 ASSAY THYROID STIM HORMONE: CPT

## 2022-07-22 PROCEDURE — 80053 COMPREHEN METABOLIC PANEL: CPT

## 2022-07-22 PROCEDURE — 85027 COMPLETE CBC AUTOMATED: CPT

## 2022-07-22 PROCEDURE — 82607 VITAMIN B-12: CPT

## 2022-07-22 PROCEDURE — 83540 ASSAY OF IRON: CPT

## 2022-07-22 PROCEDURE — 36415 COLL VENOUS BLD VENIPUNCTURE: CPT

## 2022-07-22 PROCEDURE — 82306 VITAMIN D 25 HYDROXY: CPT

## 2022-09-02 ENCOUNTER — HOSPITAL ENCOUNTER (OUTPATIENT)
Dept: MRI IMAGING | Facility: HOSPITAL | Age: 62
Discharge: HOME | End: 2022-09-02
Payer: MEDICARE

## 2022-09-02 DIAGNOSIS — N85.02 EIN (ENDOMETRIAL INTRAEPITHELIAL NEOPLASIA): ICD-10-CM

## 2022-09-02 PROCEDURE — G1004 CDSM NDSC: HCPCS

## 2022-09-02 PROCEDURE — 72197 MRI PELVIS W/O & W/DYE: CPT

## 2022-09-02 PROCEDURE — A9585 GADOBUTROL INJECTION: HCPCS | Performed by: RADIOLOGY

## 2022-09-02 RX ADMIN — GADOBUTROL 9 ML: 604.72 INJECTION INTRAVENOUS at 14:35

## 2022-10-15 ENCOUNTER — HOSPITAL ENCOUNTER (OUTPATIENT)
Dept: MRI IMAGING | Facility: HOSPITAL | Age: 62
Discharge: HOME | End: 2022-10-15
Payer: MEDICARE

## 2022-10-15 DIAGNOSIS — N85.02 EIN (ENDOMETRIAL INTRAEPITHELIAL NEOPLASIA): ICD-10-CM

## 2022-10-15 PROCEDURE — 74183 MRI ABD W/O CNTR FLWD CNTR: CPT

## 2022-10-15 PROCEDURE — G1004 CDSM NDSC: HCPCS

## 2022-10-15 PROCEDURE — A9585 GADOBUTROL INJECTION: HCPCS | Performed by: RADIOLOGY

## 2022-10-15 RX ADMIN — GADOBUTROL 9 ML: 604.72 INJECTION INTRAVENOUS at 17:09

## 2022-10-17 ENCOUNTER — TELEPHONE (OUTPATIENT)
Dept: LAB | Facility: HOSPITAL | Age: 62
End: 2022-10-17

## 2022-10-20 ENCOUNTER — APPOINTMENT (OUTPATIENT)
Dept: LAB | Facility: HOSPITAL | Age: 62
End: 2022-10-20
Payer: MEDICARE

## 2022-10-20 DIAGNOSIS — L08.9 WOUND INFECTION: ICD-10-CM

## 2022-10-20 DIAGNOSIS — T14.8XXA WOUND INFECTION: ICD-10-CM

## 2022-10-20 LAB
BASOPHILS # BLD AUTO: 0.08 THOUSANDS/ÂΜL (ref 0–0.1)
BASOPHILS NFR BLD AUTO: 1 % (ref 0–1)
EOSINOPHIL # BLD AUTO: 0.26 THOUSAND/ÂΜL (ref 0–0.61)
EOSINOPHIL NFR BLD AUTO: 4 % (ref 0–6)
ERYTHROCYTE [DISTWIDTH] IN BLOOD BY AUTOMATED COUNT: 13.2 % (ref 11.6–15.1)
HCT VFR BLD AUTO: 39.1 % (ref 34.8–46.1)
HGB BLD-MCNC: 12.1 G/DL (ref 11.5–15.4)
IMM GRANULOCYTES # BLD AUTO: 0.02 THOUSAND/UL (ref 0–0.2)
IMM GRANULOCYTES NFR BLD AUTO: 0 % (ref 0–2)
LYMPHOCYTES # BLD AUTO: 1.57 THOUSANDS/ÂΜL (ref 0.6–4.47)
LYMPHOCYTES NFR BLD AUTO: 27 % (ref 14–44)
MCH RBC QN AUTO: 30.4 PG (ref 26.8–34.3)
MCHC RBC AUTO-ENTMCNC: 30.9 G/DL (ref 31.4–37.4)
MCV RBC AUTO: 98 FL (ref 82–98)
MONOCYTES # BLD AUTO: 0.32 THOUSAND/ÂΜL (ref 0.17–1.22)
MONOCYTES NFR BLD AUTO: 6 % (ref 4–12)
NEUTROPHILS # BLD AUTO: 3.62 THOUSANDS/ÂΜL (ref 1.85–7.62)
NEUTS SEG NFR BLD AUTO: 62 % (ref 43–75)
NRBC BLD AUTO-RTO: 0 /100 WBCS
PLATELET # BLD AUTO: 316 THOUSANDS/UL (ref 149–390)
PMV BLD AUTO: 10.8 FL (ref 8.9–12.7)
RBC # BLD AUTO: 3.98 MILLION/UL (ref 3.81–5.12)
WBC # BLD AUTO: 5.87 THOUSAND/UL (ref 4.31–10.16)

## 2022-10-20 PROCEDURE — 36415 COLL VENOUS BLD VENIPUNCTURE: CPT

## 2022-10-20 PROCEDURE — 85025 COMPLETE CBC W/AUTO DIFF WBC: CPT

## 2022-11-28 ENCOUNTER — HOSPITAL ENCOUNTER (OUTPATIENT)
Dept: CT IMAGING | Facility: HOSPITAL | Age: 62
Discharge: HOME | End: 2022-11-28

## 2022-11-28 DIAGNOSIS — R93.5 ABNORMAL MRI OF ABDOMEN: ICD-10-CM

## 2022-11-28 RX ADMIN — IOHEXOL 100 ML: 350 INJECTION, SOLUTION INTRAVENOUS at 13:09

## 2023-01-06 NOTE — PROGRESS NOTES
1/9/2023    Giovanna Turkish  1960  [id number]        Assessment  -Nephrolithiasis  -Urinary incontinence  -Recurrent urinary tract infections     Discussion/Plan  Marisol Roman is a 63 y o  female who presents in consultation    1  Nephrolithiasis-we reviewed the results of her last CT scan from 11/28/2022 performed with IV contrast which noted numerous nonobstructing right renal calculi, largest measuring 9 mm in size  There is no evidence of hydronephrosis  Patient is currently asymptomatic  She is not interested in surgical intervention unless necessary  We did discuss implications for surgery including recurrent urinary tract infections, kidney compromise, or symptoms such as flank pain or gross hematuria  Patient verbalizes understanding  Dietary precautions reviewed  Continue to monitor stone burden  2  Urinary incontinence-symptoms of urinary incontinence likely secondary to Guillermo-Danlos syndrome  She is not interested in pelvic floor physical therapy  We discussed tertiary management such as PTNS therapy, bladder Botox injections, or InterStim device  Unfortunately, she would not be a candidate for PTNS as she reports symptoms of bilateral lower extremity neuropathy  Provided patient educational information  3  Recurrent urinary tract infections-urine dip in the office today positive for nitrites, we will send for urinalysis with microscopic and culture  Patient currently asymptomatic  We discussed symptoms of acute urinary tract infection versus colonization of bacteria in bladder  Plan to treat infections as needed  Follow-up in 6 months to reevaluate her symptoms and review KUB and renal ultrasound  Plan to treat urinary tract infections as needed  She was advised to call sooner with any questions or issues      -All questions answered, patients agree with plan     History of Present Illness  63 y o  female who presents in consultation today for evaluation of urinary incontinence    She was referred by her PCP  Patient is transferring care due to insurance changes and previously followed with [facility]  She has a history of Guillermo-Danlos syndrome causing weak pelvic floor muscles  Patient has longstanding history of urinary incontinence and has tried Vesicare and Myrbetriq without any relief  She underwent cystoscopy in June 2022 for history of gross hematuria which was unremarkable  Recent CT renal protocol performed 11/28/2022 noted nonobstructing right renal calculi without any evidence of obstruction or hydronephrosis  Largest stone measuring 9 mm  She states she is never required surgical intervention  Patient also reports history of recurrent urinary tract infections  Last positive urine culture was from May 2022  She reports symptoms of foul-smelling odor  Patient wears at least 1 sanitary pad daily for protection  Patient denies any additional urologic history, surgical intervention, or instrumentation  She is a lifetime non-smoker and denies any strong family history of bladder or kidney malignancy  Review of Systems  Review of Systems   Constitutional: Negative  HENT: Negative  Respiratory: Negative  Cardiovascular: Negative  Gastrointestinal: Negative  Genitourinary: Positive for frequency  Negative for decreased urine volume, difficulty urinating, dysuria, flank pain, hematuria and urgency  Musculoskeletal: Negative  Skin: Negative  Neurological: Negative  Psychiatric/Behavioral: Negative          Past Medical History  Past Medical History:   Diagnosis Date   • Bilateral foot-drop    • Depression    • Ear problems    • Guillermo-Danlos syndrome    • Family hx of colon cancer    • Foot drop, bilateral    • History of fall    • HL (hearing loss)    • Hyperlipidemia    • Hypertension    • Kidney stone    • Neuropathy    • Obesity    • Peripheral neuropathy     severe   • Sleep apnea    • Spinal stenosis    • Suicide attempt [facility])    • Urinary incontinence        Past Social History  Past Surgical History:   Procedure Laterality Date   • BACK SURGERY     •  SECTION      x 2   • COLONOSCOPY  2013    Normal with pour prep by Dr Ethan Roberts   • FRACTURE SURGERY      tibial    • MI COLONOSCOPY FLX DX W/COLLJ SPEC WHEN PFRMD N/A 2019    Normal by Dr Vira Cruz   • X-STOP IMPLANTATION      2017       Past Family History  No family history on file      Past Social history  Social History     Socioeconomic History   • Marital status: /Civil Union     Spouse name: Not on file   • Number of children: Not on file   • Years of education: Not on file   • Highest education level: Not on file   Occupational History   • Not on file   Tobacco Use   • Smoking status: Never   • Smokeless tobacco: Never   Substance and Sexual Activity   • Alcohol use: Yes     Comment: rare   • Drug use: No   • Sexual activity: Not on file   Other Topics Concern   • Not on file   Social History Narrative   • Not on file     Social Determinants of Health     Financial Resource Strain: Not on file   Food Insecurity: Not on file   Transportation Needs: Not on file   Physical Activity: Not on file   Stress: Not on file   Social Connections: Not on file   Intimate Partner Violence: Not on file   Housing Stability: Not on file       Current Medications  Current Outpatient Medications   Medication Sig Dispense Refill   • ARIPiprazole (ABILIFY) 5 mg tablet Take 5 mg by mouth daily     • Biotin 63791 MCG TBDP Take by mouth     • Cholecalciferol 2000 units TABS Take 2,000 Units by mouth (Patient not taking: Reported on 3/18/2022 )     • DULoxetine (CYMBALTA) 20 mg capsule Take 1 capsule (20 mg total) by mouth 2 (two) times a day (Patient not taking: Reported on 3/18/2022 ) 60 capsule 3   • DULoxetine (CYMBALTA) 60 mg delayed release capsule      • Ferrous Sulfate (IRON PO) Take 65 mg by mouth     • lisinopril (ZESTRIL) 20 mg tablet Take 1 tablet by mouth daily (Patient not taking: Reported on 3/18/2022 )     • Motegrity 2 MG TABS TAKE 1 TABLET BY MOUTH EVERY MORNING 90 tablet 3   • Multiple Vitamins-Minerals (CENTRUM SILVER PO) Take 1 tablet by mouth daily     • nitrofurantoin (MACROBID) 100 mg capsule TAKE 1 CAPSULE BY MOUTH TWICE A DAY FOR 7 DAYS (Patient not taking: Reported on 3/18/2022)     • nystatin (MYCOSTATIN) powder Apply topically 2 (two) times a day     • OXcarbazepine (TRILEPTAL) 150 mg tablet Take 150 mg by mouth 2 (two) times a day     • pravastatin (PRAVACHOL) 40 mg tablet Take 40 mg by mouth every other day     • QUEtiapine (SEROquel) 25 mg tablet Take 25 mg by mouth daily at bedtime (Patient not taking: Reported on 3/18/2022 )       No current facility-administered medications for this visit  Allergies  Allergies   Allergen Reactions   • Ciprofloxacin      "shakes"       Past medical history, social history, family history, medications and allergies were reviewed  Vitals  There were no vitals filed for this visit  Physical Exam  Physical Exam  Constitutional:       Appearance: Normal appearance  She is well-developed  HENT:      Head: Normocephalic  Eyes:      Pupils: Pupils are equal, round, and reactive to light  Pulmonary:      Effort: Pulmonary effort is normal    Abdominal:      Palpations: Abdomen is soft  Tenderness: There is no right CVA tenderness or left CVA tenderness  Musculoskeletal:         General: Normal range of motion  Cervical back: Normal range of motion  Comments: Wheelchair-bound, bilateral lower extremity braces   Skin:     General: Skin is warm and dry  Neurological:      General: No focal deficit present  Mental Status: She is alert and oriented to person, place, and time  Psychiatric:         Mood and Affect: Mood normal          Behavior: Behavior normal          Thought Content:  Thought content normal          Judgment: Judgment normal          Results    I have personally reviewed all pertinent lab results and reviewed with patient  Lab Results   Component Value Date    GLUCOSE 166 (H) 04/05/2018    CALCIUM 9 5 07/22/2022    K 3 8 07/22/2022    CO2 28 07/22/2022     07/22/2022    BUN 26 (H) 07/22/2022    CREATININE 0 42 (L) 07/22/2022     Lab Results   Component Value Date    WBC 5 87 10/20/2022    HGB 12 1 10/20/2022    HCT 39 1 10/20/2022    MCV 98 10/20/2022     10/20/2022     No results found for this or any previous visit (from the past 1 hour(s))

## 2023-01-09 ENCOUNTER — OFFICE VISIT (OUTPATIENT)
Dept: UROLOGY | Facility: AMBULATORY SURGERY CENTER | Age: 63
End: 2023-01-09

## 2023-01-09 VITALS
HEART RATE: 82 BPM | BODY MASS INDEX: 31.32 KG/M2 | SYSTOLIC BLOOD PRESSURE: 110 MMHG | OXYGEN SATURATION: 96 % | HEIGHT: 67 IN | DIASTOLIC BLOOD PRESSURE: 74 MMHG

## 2023-01-09 DIAGNOSIS — R35.0 URINARY FREQUENCY: Primary | ICD-10-CM

## 2023-01-09 DIAGNOSIS — N20.0 NEPHROLITHIASIS: ICD-10-CM

## 2023-01-09 DIAGNOSIS — R32 URINARY INCONTINENCE, UNSPECIFIED TYPE: ICD-10-CM

## 2023-01-09 LAB
BACTERIA UR QL AUTO: ABNORMAL /HPF
BILIRUB UR QL STRIP: NEGATIVE
CLARITY UR: CLEAR
COLOR UR: ABNORMAL
GLUCOSE UR STRIP-MCNC: NEGATIVE MG/DL
HGB UR QL STRIP.AUTO: NEGATIVE
KETONES UR STRIP-MCNC: NEGATIVE MG/DL
LEUKOCYTE ESTERASE UR QL STRIP: ABNORMAL
NITRITE UR QL STRIP: POSITIVE
NON-SQ EPI CELLS URNS QL MICRO: ABNORMAL /HPF
PH UR STRIP.AUTO: 7.5 [PH]
POST-VOID RESIDUAL VOLUME, ML POC: 81 ML
PROT UR STRIP-MCNC: NEGATIVE MG/DL
RBC #/AREA URNS AUTO: ABNORMAL /HPF
SL AMB  POCT GLUCOSE, UA: ABNORMAL
SL AMB LEUKOCYTE ESTERASE,UA: POSITIVE
SL AMB POCT BILIRUBIN,UA: ABNORMAL
SL AMB POCT BLOOD,UA: + 2
SL AMB POCT CLARITY,UA: ABNORMAL
SL AMB POCT COLOR,UA: YELLOW
SL AMB POCT KETONES,UA: ABNORMAL
SL AMB POCT NITRITE,UA: ABNORMAL
SL AMB POCT PH,UA: 7
SL AMB POCT SPECIFIC GRAVITY,UA: 1.02
SL AMB POCT URINE PROTEIN: ABNORMAL
SL AMB POCT UROBILINOGEN: 0.2
SP GR UR STRIP.AUTO: 1.01 (ref 1–1.03)
UROBILINOGEN UR STRIP-ACNC: <2 MG/DL
WBC #/AREA URNS AUTO: ABNORMAL /HPF

## 2023-01-11 ENCOUNTER — TELEPHONE (OUTPATIENT)
Dept: UROLOGY | Facility: HOSPITAL | Age: 63
End: 2023-01-11

## 2023-01-11 DIAGNOSIS — N39.0 URINARY TRACT INFECTION WITHOUT HEMATURIA, SITE UNSPECIFIED: Primary | ICD-10-CM

## 2023-01-11 LAB — BACTERIA UR CULT: ABNORMAL

## 2023-01-11 RX ORDER — CEPHALEXIN 500 MG/1
500 CAPSULE ORAL EVERY 12 HOURS SCHEDULED
Qty: 14 CAPSULE | Refills: 0 | Status: SHIPPED | OUTPATIENT
Start: 2023-01-11 | End: 2023-01-18

## 2023-01-11 NOTE — TELEPHONE ENCOUNTER
1/11/2023  2:32 PM EST Back to Top      Urine culture positive for infection, prescription for Keflex was sent to her pharmacy       Called Nery and notified her that Keflex was sent to pharmacy she asked me to call [facility] life @[phone]

## 2023-01-11 NOTE — TELEPHONE ENCOUNTER
MAEGAN Sherman and relayed that our office sent Kelfex to [facility] in [place] and [place]  Asked me to notify her the script is Kelfex 14 capsules 1  Capsule every 12 hours for 7 days

## 2023-02-27 ENCOUNTER — HOSPITAL ENCOUNTER (OUTPATIENT)
Dept: MAMMOGRAPHY | Facility: MEDICAL CENTER | Age: 63
Discharge: HOME | End: 2023-02-27

## 2023-02-27 VITALS — WEIGHT: 200 LBS | HEIGHT: 67 IN | BODY MASS INDEX: 31.39 KG/M2

## 2023-02-27 DIAGNOSIS — Z12.31 ENCOUNTER FOR SCREENING MAMMOGRAM FOR MALIGNANT NEOPLASM OF BREAST: ICD-10-CM

## 2023-07-05 ENCOUNTER — HOSPITAL ENCOUNTER (OUTPATIENT)
Dept: RADIOLOGY | Facility: HOSPITAL | Age: 63
Discharge: HOME | End: 2023-07-05
Payer: MEDICARE

## 2023-07-05 ENCOUNTER — HOSPITAL ENCOUNTER (OUTPATIENT)
Dept: ULTRASOUND IMAGING | Facility: HOSPITAL | Age: 63
Discharge: HOME | End: 2023-07-05
Payer: MEDICARE

## 2023-07-05 DIAGNOSIS — N20.0 NEPHROLITHIASIS: ICD-10-CM

## 2023-07-05 PROCEDURE — 76775 US EXAM ABDO BACK WALL LIM: CPT

## 2023-07-05 PROCEDURE — 74018 RADEX ABDOMEN 1 VIEW: CPT

## 2023-07-19 NOTE — PROGRESS NOTES
2023    Jason Madsen  1960  [id number]        Assessment  Nephrolithiasis       Discussion  We discussed the fact that her ultrasound did not reveal any stones, however the KUB showed 1 cm calculus overlying the right UPJ. I do not personally visualize any stone when viewing the KUB image. We discussed the need for a CT urogram for a definitive image of a potential stone to determine treatment options. She understands that she might require surgery if there is a stone present on CT scan. History of Present Illness  66 y.o. female with a history of nephrolithiasis. She had a CT scan done in 2022 which revealed numerous nonobstructing right renal calculi with the largest measuring 9 mm. She presents today for follow-up. A renal ultrasound on 23 did not appreciate any stones. A KUB performed at the same time showed a 1 cm calculus overlying the right UPJ. There is no evidence of hydronephrosis. Patient is currently asymptomatic. She denies any hematuria or flank pain. AUA Symptom Score      Review of Systems  Review of Systems   Constitutional: Negative. Respiratory: Negative. Cardiovascular: Negative. Gastrointestinal: Negative. Genitourinary: Negative for decreased urine volume, difficulty urinating, dysuria, flank pain, frequency, hematuria and urgency. Musculoskeletal: Negative.           Past Medical History  Past Medical History:   Diagnosis Date   • Bilateral foot-drop    • Depression    • Ear problems    • Guillermo-Danlos syndrome    • Family hx of colon cancer    • Foot drop, bilateral    • History of fall    • HL (hearing loss)    • Hyperlipidemia    • Hypertension    • Kidney stone    • Neuropathy    • Obesity    • Peripheral neuropathy     severe   • Sleep apnea    • Spinal stenosis    • Suicide attempt [facility])    • Urinary incontinence        Past Social History  Past Surgical History:   Procedure Laterality Date   • BACK SURGERY     •  SECTION      x 2   • COLONOSCOPY  2013    Normal with pour prep by Dr. Allison Sarmiento   • FRACTURE SURGERY      tibial    • [place] COLONOSCOPY FLX DX W/COLLJ SPEC WHEN PFRMD N/A 01/18/2019    Normal by Dr. Chapin Street   • X-STOP IMPLANTATION      2017       Past Family History  Family History   Problem Relation Age of Onset   • Prostate cancer Father    • No Known Problems Sister    • No Known Problems Daughter    • No Known Problems Maternal Grandmother    • No Known Problems Maternal Grandfather    • No Known Problems Paternal Grandmother    • No Known Problems Paternal Grandfather    • Breast cancer Maternal Aunt 79   • No Known Problems Paternal Aunt        Past Social history  Social History     Socioeconomic History   • Marital status: /Civil Union     Spouse name: Not on file   • Number of children: Not on file   • Years of education: Not on file   • Highest education level: Not on file   Occupational History   • Not on file   Tobacco Use   • Smoking status: Never   • Smokeless tobacco: Never   Vaping Use   • Vaping Use: Never used   Substance and Sexual Activity   • Alcohol use: Yes     Comment: rare   • Drug use: No   • Sexual activity: Not Currently   Other Topics Concern   • Not on file   Social History Narrative   • Not on file     Social Determinants of Health     Financial Resource Strain: Not on file   Food Insecurity: Not on file   Transportation Needs: Not on file   Physical Activity: Not on file   Stress: Not on file   Social Connections: Not on file   Intimate Partner Violence: Not on file   Housing Stability: Not on file       Current Medications  Current Outpatient Medications   Medication Sig Dispense Refill   • ARIPiprazole (ABILIFY) 5 mg tablet Take 5 mg by mouth daily     • Biotin 78847 MCG TBDP Take by mouth     • DULoxetine (CYMBALTA) 60 mg delayed release capsule      • Ferrous Sulfate (IRON PO) Take 65 mg by mouth     • lisinopril (ZESTRIL) 20 mg tablet Take 1 tablet by mouth daily     • Motegrity 2 MG TABS TAKE 1 TABLET BY MOUTH EVERY MORNING 90 tablet 3   • Multiple Vitamins-Minerals (CENTRUM SILVER PO) Take 1 tablet by mouth daily     • nystatin (MYCOSTATIN) powder Apply topically 2 (two) times a day     • OXcarbazepine (TRILEPTAL) 150 mg tablet Take 150 mg by mouth 2 (two) times a day     • pravastatin (PRAVACHOL) 40 mg tablet Take 40 mg by mouth every other day     • Cholecalciferol 2000 units TABS Take 2,000 Units by mouth (Patient not taking: Reported on 3/18/2022)     • DULoxetine (CYMBALTA) 20 mg capsule Take 1 capsule (20 mg total) by mouth 2 (two) times a day (Patient not taking: Reported on 3/18/2022) 60 capsule 3   • nitrofurantoin (MACROBID) 100 mg capsule TAKE 1 CAPSULE BY MOUTH TWICE A DAY FOR 7 DAYS (Patient not taking: No sig reported)     • QUEtiapine (SEROquel) 25 mg tablet Take 25 mg by mouth daily at bedtime (Patient not taking: Reported on 3/18/2022)       No current facility-administered medications for this visit. Allergies  Allergies   Allergen Reactions   • Ciprofloxacin      "shakes"       Past Medical History, Social History, Family History, medications and allergies were reviewed. Vitals  Vitals:    07/21/23 1339   BP: 116/86   BP Location: Left arm   Patient Position: Sitting   Cuff Size: Large   Pulse: 80   SpO2: 96%       Physical Exam  Physical Exam  Vitals and nursing note reviewed. Constitutional:       General: She is not in acute distress. Appearance: Normal appearance. She is well-developed. She is not diaphoretic. HENT:      Head: Normocephalic and atraumatic. Eyes:      Conjunctiva/sclera: Conjunctivae normal.   Pulmonary:      Effort: Pulmonary effort is normal.   Musculoskeletal:      Cervical back: Normal range of motion. Comments: Pt in wheelchair, lower extremity braces   Skin:     General: Skin is warm and dry. Coloration: Skin is not jaundiced. Neurological:      General: No focal deficit present.       Mental Status: She is alert and oriented to person, place, and time. Psychiatric:         Mood and Affect: Mood normal.         Speech: Speech normal.         Behavior: Behavior normal.         Thought Content: Thought content normal.         Judgment: Judgment normal.             Results  No results found for: "PSA"  Lab Results   Component Value Date    GLUCOSE 166 (H) 04/05/2018    CALCIUM 9.5 07/22/2022    K 3.8 07/22/2022    CO2 28 07/22/2022     07/22/2022    BUN 26 (H) 07/22/2022    CREATININE 0.42 (L) 07/22/2022     Lab Results   Component Value Date    WBC 5.87 10/20/2022    HGB 12.1 10/20/2022    HCT 39.1 10/20/2022    MCV 98 10/20/2022     10/20/2022         Office Urine Dip  No results found for this or any previous visit (from the past 1 hour(s)). ]      {[id number]}

## 2023-07-21 ENCOUNTER — TELEPHONE (OUTPATIENT)
Dept: UROLOGY | Facility: AMBULATORY SURGERY CENTER | Age: 63
End: 2023-07-21

## 2023-07-21 ENCOUNTER — OFFICE VISIT (OUTPATIENT)
Dept: UROLOGY | Facility: AMBULATORY SURGERY CENTER | Age: 63
End: 2023-07-21
Payer: MEDICARE

## 2023-07-21 VITALS — OXYGEN SATURATION: 96 % | DIASTOLIC BLOOD PRESSURE: 86 MMHG | HEART RATE: 80 BPM | SYSTOLIC BLOOD PRESSURE: 116 MMHG

## 2023-07-21 DIAGNOSIS — R35.0 URINARY FREQUENCY: ICD-10-CM

## 2023-07-21 DIAGNOSIS — N20.0 NEPHROLITHIASIS: Primary | ICD-10-CM

## 2023-07-21 PROCEDURE — 99213 OFFICE O/P EST LOW 20 MIN: CPT | Performed by: UROLOGY

## 2023-07-21 NOTE — TELEPHONE ENCOUNTER
Called patient and LVM asking patient to please give office a call back due to her 1:30 appointment. Patient has follow up appointment today 7/21 at 1:30 with Beatris Montano but due to an emergency Beatris Montano is out of office but still willing to see her patients on a virtual visit. I did offer patient virtual visit at 1:30 with Beatris Montano and asked that she please call office back to confirm or reschedule. Office number was provided.

## 2023-07-21 NOTE — PROGRESS NOTES
7/21/2023    Marveen Gosselin  1960  [id number]      Assessment  Nephrolithiasis, possible 1 cm right UPJ calculus, advanced neuropathy with immobility      Discussion  We discussed the fact that her ultrasound did not reveal any stones, however the KUB showed 1 cm calculus overlying the right UPJ. I do not personally visualize any stone when viewing the KUB image. I recommend obtaining a CT stone study to assess the size and location of her stone. Secondary to her advanced neuropathy and being wheelchair-bound her follow-up visit will be a telemedicine visit. History of Present Illness  66 y.o. female with a history of nephrolithiasis. She had a CT scan done in November 2022 which revealed numerous nonobstructing right renal calculi with the largest measuring 9 mm. She presents today for follow-up. A renal ultrasound on 7/5/23 did not appreciate any stones. A KUB performed at the same time showed a 1 cm calculus overlying the right UPJ. There is no evidence of hydronephrosis. Patient is currently asymptomatic. She denies any hematuria or flank pain. AUA Symptom Score      Review of Systems  Review of Systems   Constitutional: Negative. HENT: Negative. Eyes: Negative. Respiratory: Negative. Cardiovascular: Negative. Gastrointestinal: Negative. Endocrine: Negative. Genitourinary: Negative for decreased urine volume, difficulty urinating, dysuria, flank pain, frequency, hematuria and urgency. Per HPI   Musculoskeletal: Negative. Skin: Negative. Allergic/Immunologic: Negative. Neurological: Negative. Hematological: Negative. Psychiatric/Behavioral: Negative.           Past Medical History  Past Medical History:   Diagnosis Date   • Bilateral foot-drop    • Depression    • Ear problems    • Guillermo-Danlos syndrome    • Family hx of colon cancer    • Foot drop, bilateral    • History of fall    • HL (hearing loss)    • Hyperlipidemia    • Hypertension    • Kidney stone • Neuropathy    • Obesity    • Peripheral neuropathy     severe   • Sleep apnea    • Spinal stenosis    • Suicide attempt [facility])    • Urinary incontinence        Past Social History  Past Surgical History:   Procedure Laterality Date   • BACK SURGERY     •  SECTION      x 2   • COLONOSCOPY      Normal with pour prep by Dr. Suzi Adrian   • FRACTURE SURGERY      tibial    • [place] COLONOSCOPY FLX DX W/COLLJ SPEC WHEN PFRMD N/A 2019    Normal by Dr. Shakira Chand   • X-STOP IMPLANTATION             Past Family History  Family History   Problem Relation Age of Onset   • Prostate cancer Father    • No Known Problems Sister    • No Known Problems Daughter    • No Known Problems Maternal Grandmother    • No Known Problems Maternal Grandfather    • No Known Problems Paternal Grandmother    • No Known Problems Paternal Grandfather    • Breast cancer Maternal Aunt 79   • No Known Problems Paternal Aunt        Past Social history  Social History     Socioeconomic History   • Marital status: /Civil Union     Spouse name: Not on file   • Number of children: Not on file   • Years of education: Not on file   • Highest education level: Not on file   Occupational History   • Not on file   Tobacco Use   • Smoking status: Never   • Smokeless tobacco: Never   Vaping Use   • Vaping Use: Never used   Substance and Sexual Activity   • Alcohol use: Yes     Comment: rare   • Drug use: No   • Sexual activity: Not Currently   Other Topics Concern   • Not on file   Social History Narrative   • Not on file     Social Determinants of Health     Financial Resource Strain: Not on file   Food Insecurity: Not on file   Transportation Needs: Not on file   Physical Activity: Not on file   Stress: Not on file   Social Connections: Not on file   Intimate Partner Violence: Not on file   Housing Stability: Not on file       Current Medications  Current Outpatient Medications   Medication Sig Dispense Refill   • ARIPiprazole (ABILIFY) 5 mg tablet Take 5 mg by mouth daily     • Biotin 82399 MCG TBDP Take by mouth     • DULoxetine (CYMBALTA) 60 mg delayed release capsule      • Ferrous Sulfate (IRON PO) Take 65 mg by mouth     • lisinopril (ZESTRIL) 20 mg tablet Take 1 tablet by mouth daily     • Motegrity 2 MG TABS TAKE 1 TABLET BY MOUTH EVERY MORNING 90 tablet 3   • Multiple Vitamins-Minerals (CENTRUM SILVER PO) Take 1 tablet by mouth daily     • nystatin (MYCOSTATIN) powder Apply topically 2 (two) times a day     • OXcarbazepine (TRILEPTAL) 150 mg tablet Take 150 mg by mouth 2 (two) times a day     • pravastatin (PRAVACHOL) 40 mg tablet Take 40 mg by mouth every other day     • Cholecalciferol 2000 units TABS Take 2,000 Units by mouth (Patient not taking: Reported on 3/18/2022)     • DULoxetine (CYMBALTA) 20 mg capsule Take 1 capsule (20 mg total) by mouth 2 (two) times a day (Patient not taking: Reported on 3/18/2022) 60 capsule 3   • nitrofurantoin (MACROBID) 100 mg capsule TAKE 1 CAPSULE BY MOUTH TWICE A DAY FOR 7 DAYS (Patient not taking: No sig reported)     • QUEtiapine (SEROquel) 25 mg tablet Take 25 mg by mouth daily at bedtime (Patient not taking: Reported on 3/18/2022)       No current facility-administered medications for this visit. Allergies  Allergies   Allergen Reactions   • Ciprofloxacin      "shakes"       Past Medical History, Social History, Family History, medications and allergies were reviewed. Vitals  Vitals:    07/21/23 1339   BP: 116/86   BP Location: Left arm   Patient Position: Sitting   Cuff Size: Large   Pulse: 80   SpO2: 96%       Physical Exam  Physical Exam  Vitals and nursing note reviewed. Constitutional:       General: She is not in acute distress. Appearance: Normal appearance. She is well-developed. She is not diaphoretic. HENT:      Head: Normocephalic and atraumatic.    Eyes:      Conjunctiva/sclera: Conjunctivae normal.   Pulmonary:      Effort: Pulmonary effort is normal.   Musculoskeletal: Cervical back: Normal range of motion. Comments: Pt in wheelchair, bilateral leg braces   Skin:     General: Skin is warm and dry. Coloration: Skin is not jaundiced. Neurological:      General: No focal deficit present. Mental Status: She is alert and oriented to person, place, and time.    Psychiatric:         Mood and Affect: Mood normal.         Speech: Speech normal.         Behavior: Behavior normal.             Results  No results found for: "PSA"  Lab Results   Component Value Date    GLUCOSE 166 (H) 04/05/2018    CALCIUM 9.5 07/22/2022    K 3.8 07/22/2022    CO2 28 07/22/2022     07/22/2022    BUN 26 (H) 07/22/2022    CREATININE 0.42 (L) 07/22/2022     Lab Results   Component Value Date    WBC 5.87 10/20/2022    HGB 12.1 10/20/2022    HCT 39.1 10/20/2022    MCV 98 10/20/2022     10/20/2022         Office Urine Dip  No results found for this or any previous visit (from the past 1 hour(s)).]

## 2023-08-10 ENCOUNTER — HOSPITAL ENCOUNTER (OUTPATIENT)
Dept: CT IMAGING | Facility: HOSPITAL | Age: 63
Discharge: HOME | End: 2023-08-10
Payer: COMMERCIAL

## 2023-08-10 ENCOUNTER — TELEPHONE (OUTPATIENT)
Dept: UROLOGY | Facility: AMBULATORY SURGERY CENTER | Age: 63
End: 2023-08-10

## 2023-08-10 DIAGNOSIS — N20.0 NEPHROLITHIASIS: ICD-10-CM

## 2023-08-10 PROCEDURE — G1004 CDSM NDSC: HCPCS

## 2023-08-10 PROCEDURE — 74176 CT ABD & PELVIS W/O CONTRAST: CPT

## 2023-08-10 NOTE — TELEPHONE ENCOUNTER
Brian Nicholson from radiology called and stated if ct scan renal protocol is for kidney stones, the order needs to be changed. Kidney stone history is not usually an indicator for this type of scan. Patient is waiting there now please call as soon as possible to [phone].

## 2023-08-18 ENCOUNTER — RA CDI HCC (OUTPATIENT)
Dept: OTHER | Facility: HOSPITAL | Age: 63
End: 2023-08-18

## 2023-08-18 NOTE — TELEPHONE ENCOUNTER
Patient is called to discuss repeat colonoscopy.  He is 1 week post op cataract surgery and has not seen PCP Dr Villavicencio since 8-.  He will need office visit or may wait until after he sees PCP on 12-10-21 as he does not have a recent updated H/P.  Patient is called and left a message to discuss.  Letter also sent informing patient to call back to discuss with him   Radiology Test Results - Radiology Calling with report update  CT renal stone     Pt under care of: Charisma Molina     Imaging Completed:    Significant Findings - Please review

## 2023-08-18 NOTE — PROGRESS NOTES
[address] [facility] coding opportunities       Chart reviewed, no opportunity found: CHART REVIEWED, NO OPPORTUNITY FOUND        Patients Insurance        Commercial Insurance: [id number] [facility]

## 2023-08-29 ENCOUNTER — TELEMEDICINE (OUTPATIENT)
Dept: UROLOGY | Facility: AMBULATORY SURGERY CENTER | Age: 63
End: 2023-08-29
Payer: COMMERCIAL

## 2023-08-29 DIAGNOSIS — N20.0 NEPHROLITHIASIS: Primary | ICD-10-CM

## 2023-08-29 PROCEDURE — 99214 OFFICE O/P EST MOD 30 MIN: CPT | Performed by: NURSE PRACTITIONER

## 2023-08-29 NOTE — PROGRESS NOTES
8/29/2023     Virtual visit    Assessment and Plan    66 y.o. female managed by our office. 1.  Nephrolithiasis  · Most recent CT study reveals a 13, 10 and 9 mm stone in the upper pole of the right kidney and a 1 mm left lower pole calculus  · Discussed recommendation for ureteroscopy with laser lithotripsy with high powered laser for right-sided stones  · Patient reports that she wishes to speak to her PCP before committing to proceeding with procedure and will call the office if she decides to proceed with stone intervention  · Discussed dietary behavior modifications to assist in reduction of stone formation  · Awaiting patient phone call for decision regarding procedure    History of Present Illness  Tito Gabriel is a 66 y.o. female here for follow up evaluation of nephrolithiasis. She most recently had a CT study revealing a 13, 10 and 9 mm stone in the right kidney and a 1 mm stone in the left lower pole of her kidney. She currently is asymptomatic with no complaints regarding her lower urinary tract symptoms. She denies suprapubic abdominal pain or flank pain. She denies fever and chills. She denies recurrence of urinary tract infections. Review of Systems   Constitutional: Negative for chills and fever. Respiratory: Negative for cough and shortness of breath. Cardiovascular: Negative for chest pain. Gastrointestinal: Negative for abdominal distention, abdominal pain, blood in stool, nausea and vomiting. Genitourinary: Negative for difficulty urinating, dysuria, enuresis, flank pain, frequency, hematuria and urgency. Skin: Negative for rash.      Past Medical History  Past Medical History:   Diagnosis Date   • Bilateral foot-drop    • Depression    • Ear problems    • Guillermo-Danlos syndrome    • Family hx of colon cancer    • Foot drop, bilateral    • History of fall    • HL (hearing loss)    • Hyperlipidemia    • Hypertension    • Kidney stone    • Neuropathy    • Obesity    • Peripheral neuropathy     severe   • Sleep apnea    • Spinal stenosis    • Suicide attempt [facility])    • Urinary incontinence        Past Social History  Past Surgical History:   Procedure Laterality Date   • BACK SURGERY     •  SECTION      x 2   • COLONOSCOPY      Normal with pour prep by Dr. Efrain Lackey   • FRACTURE SURGERY      tibial    • [place] COLONOSCOPY FLX DX W/COLLJ SPEC WHEN PFRMD N/A 2019    Normal by Dr. Bandar Carlson   • X-STOP IMPLANTATION           Social History     Tobacco Use   Smoking Status Never   Smokeless Tobacco Never       Past Family History  Family History   Problem Relation Age of Onset   • Prostate cancer Father    • No Known Problems Sister    • No Known Problems Daughter    • No Known Problems Maternal Grandmother    • No Known Problems Maternal Grandfather    • No Known Problems Paternal Grandmother    • No Known Problems Paternal Grandfather    • Breast cancer Maternal Aunt 79   • No Known Problems Paternal Aunt        Past Social history  Social History     Socioeconomic History   • Marital status: /Civil Union     Spouse name: Not on file   • Number of children: Not on file   • Years of education: Not on file   • Highest education level: Not on file   Occupational History   • Not on file   Tobacco Use   • Smoking status: Never   • Smokeless tobacco: Never   Vaping Use   • Vaping Use: Never used   Substance and Sexual Activity   • Alcohol use: Yes     Comment: rare   • Drug use: No   • Sexual activity: Not Currently   Other Topics Concern   • Not on file   Social History Narrative   • Not on file     Social Determinants of Health     Financial Resource Strain: Not on file   Food Insecurity: Not on file   Transportation Needs: Not on file   Physical Activity: Not on file   Stress: Not on file   Social Connections: Not on file   Intimate Partner Violence: Not on file   Housing Stability: Not on file       Current Medications  Current Outpatient Medications   Medication Sig Dispense Refill   • ARIPiprazole (ABILIFY) 5 mg tablet Take 5 mg by mouth daily     • Biotin 91369 MCG TBDP Take by mouth     • Cholecalciferol 2000 units TABS Take 2,000 Units by mouth (Patient not taking: Reported on 3/18/2022)     • DULoxetine (CYMBALTA) 20 mg capsule Take 1 capsule (20 mg total) by mouth 2 (two) times a day (Patient not taking: Reported on 3/18/2022) 60 capsule 3   • DULoxetine (CYMBALTA) 60 mg delayed release capsule      • Ferrous Sulfate (IRON PO) Take 65 mg by mouth     • lisinopril (ZESTRIL) 20 mg tablet Take 1 tablet by mouth daily     • Motegrity 2 MG TABS TAKE 1 TABLET BY MOUTH EVERY MORNING 90 tablet 3   • Multiple Vitamins-Minerals (CENTRUM SILVER PO) Take 1 tablet by mouth daily     • nitrofurantoin (MACROBID) 100 mg capsule TAKE 1 CAPSULE BY MOUTH TWICE A DAY FOR 7 DAYS (Patient not taking: No sig reported)     • nystatin (MYCOSTATIN) powder Apply topically 2 (two) times a day     • OXcarbazepine (TRILEPTAL) 150 mg tablet Take 150 mg by mouth 2 (two) times a day     • pravastatin (PRAVACHOL) 40 mg tablet Take 40 mg by mouth every other day     • QUEtiapine (SEROquel) 25 mg tablet Take 25 mg by mouth daily at bedtime (Patient not taking: Reported on 3/18/2022)       No current facility-administered medications for this visit. Allergies  Allergies   Allergen Reactions   • Ciprofloxacin      "shakes"         The following portions of the patient's history were reviewed and updated as appropriate: allergies, current medications, past medical history, past social history, past surgical history and problem list.      Results  No results found for this or any previous visit (from the past 1 hour(s)). ]  No results found for: "PSA"  Lab Results   Component Value Date    GLUCOSE 166 (H) 04/05/2018    CALCIUM 9.5 07/22/2022    K 3.8 07/22/2022    CO2 28 07/22/2022     07/22/2022    BUN 26 (H) 07/22/2022    CREATININE 0.42 (L) 07/22/2022     Lab Results   Component Value Date    WBC 5.87 10/20/2022    HGB 12.1 10/20/2022    HCT 39.1 10/20/2022    MCV 98 10/20/2022     10/20/2022           Orders  No orders of the defined types were placed in this encounter.       TERESA Ro

## 2023-12-15 ENCOUNTER — PATIENT OUTREACH (OUTPATIENT)
Dept: BARIATRICS | Facility: CLINIC | Age: 63
End: 2023-12-15

## 2023-12-15 NOTE — PROGRESS NOTES
Relevant Medical History: Medical History: PRE-DIABETIC and HYPERLIPIDEMIA  Relevant Labs: elevated cholesterol  Relevant Medications:    Weight History     Self Reported Current Wt: approximately 200# (in wheelchair)  Self Reported Current Ht: 66in  Highest Weight: 255#  Lowest Weight: 165#  BMI: BMI 30-34.9    Past Attempts at Weight Loss: Low Carb/High Protein Diet (Atkins, [place])    Food Recall  Breakfast: rice cake with 1 Tbsp Natural peanut butter, 1 cup blueberries  Snack: skip   Lunch: beef and bean burrito (microwaved)   Snack: cookies OR fruit  Dinner: chicken or fish, vegetable (brussel sprouts or salad)  Snack: skip    Frequency of Dining Out: once per week  Beverages: 32oz, sometimes juice  Volume of Beverage Intake: 32oz    Would the patient benefit from visit with [address] [place] [address] Blvd specialist?: emotional eating       Physical Activity Intake  Activity: none currently  Frequency of Exercise: none currently  Physical Limitations to Exercise: wheelchair bound    Sleep Quality  Wake time: varies  Bed: 10:30-11PM  Average hours of sleep per night?: 4-7  Is sleep refreshing?: No  Any history of sleep apnea?: yes    Review of Programs  Overview of medical weight management programs provided?: Yes   Is patient interested in discussing medication?: Yes   Is patient interested in discussing bariatric surgery?: No  Does patient know which pathway they are interested in?: Yes (interest in meeting with RD)

## 2023-12-19 ENCOUNTER — OFFICE VISIT (OUTPATIENT)
Dept: BARIATRICS | Facility: CLINIC | Age: 63
End: 2023-12-19
Payer: COMMERCIAL

## 2023-12-19 VITALS
BODY MASS INDEX: 38.17 KG/M2 | WEIGHT: 237.5 LBS | HEART RATE: 87 BPM | DIASTOLIC BLOOD PRESSURE: 80 MMHG | SYSTOLIC BLOOD PRESSURE: 129 MMHG | HEIGHT: 66 IN

## 2023-12-19 DIAGNOSIS — E78.5 HYPERLIPIDEMIA, UNSPECIFIED HYPERLIPIDEMIA TYPE: ICD-10-CM

## 2023-12-19 DIAGNOSIS — G47.30 SLEEP APNEA: Primary | ICD-10-CM

## 2023-12-19 PROBLEM — E66.812 OBESITY, CLASS II, BMI 35-39.9: Status: ACTIVE | Noted: 2023-12-19

## 2023-12-19 PROBLEM — E66.9 OBESITY, CLASS II, BMI 35-39.9: Status: ACTIVE | Noted: 2023-12-19

## 2023-12-19 PROCEDURE — 99204 OFFICE O/P NEW MOD 45 MIN: CPT | Performed by: PHYSICIAN ASSISTANT

## 2023-12-19 NOTE — ASSESSMENT & PLAN NOTE
-Discussed options of [facility]-Intensive Lifestyle Intervention Program, Very Low Calorie Diet-VLCD, and Conservative Program and the role of weight loss medications.Explained the importance of making lifestyle changes if utilizing medication to aid in weight loss. Options for weight loss medications including phentermine and metformin discussed. No AOM coverage, hx of renal stones so cannot use topamax and was on wellbutrin prior.   -Initial weight loss goal of 5-10% weight loss for improved health  -Screening labs and records reviewed from yesterday. BS was 130 but she was not fasting.      -Patient is interested in pursuing Conservative Program and RD visits  -Calorie goals (1400), sample menu (9530-8751 low carb), portion size guidelines, and food logging reviewed with the patient.     Goals:  -Food log (ie.) carbmanager or paper.  Recommend under 60 gm carbs and 1400 calories. Prefers to do low carb. Dietary suggestions given  - To drink at least 64oz of water daily.No sugary beverages.  -recommend starting with 1/2 week of extra activity. Suggested chair yoga or banded activities.     Initial Weight:237.5  Goal Weight:160s

## 2023-12-19 NOTE — PROGRESS NOTES
Assessment/Plan:    Obesity, Class II, BMI 35-39.9  -Discussed options of HealthyCORE-Intensive Lifestyle Intervention Program, Very Low Calorie Diet-VLCD, and Conservative Program and the role of weight loss medications.Explained the importance of making lifestyle changes if utilizing medication to aid in weight loss. Options for weight loss medications including phentermine and metformin discussed. No AOM coverage, hx of renal stones so cannot use topamax and was on wellbutrin prior.   -Initial weight loss goal of 5-10% weight loss for improved health  -Screening labs and records reviewed from yesterday. BS was 130 but she was not fasting.      -Patient is interested in pursuing Conservative Program and RD visits  -Calorie goals (1400), sample menu (5046-8532 low carb), portion size guidelines, and food logging reviewed with the patient.     Goals:  -Food log (ie.) carbmanager or paper.  Recommend under 60 gm carbs and 1400 calories. Prefers to do low carb. Dietary suggestions given  - To drink at least 64oz of water daily.No sugary beverages.  -recommend starting with 1/2 week of extra activity. Suggested chair yoga or banded activities.     Initial Weight:237.5  Goal Weight:160s       Sleep apnea  Not currently treated  -should improve with weight loss, dietary, and lifestyle changes      Hyperlipidemia  On pravastatin  -should improve with weight loss, dietary, and lifestyle changes    Follow up in approximately  RD visit in 2-3 weeks, nurse visit in 6 weeks and 4 months   with Non-Surgical Physician/Advanced Practitioner.  I have spent a total time of 45 minutes on 12/19/23 in caring for this patient including Diagnostic results, Prognosis, Risks and benefits of tx options, Instructions for management, Patient and family education, Importance of tx compliance, Risk factor reductions, Impressions, Counseling / Coordination of care, Documenting in the medical record, Reviewing / ordering tests, medicine,  procedures  , and Obtaining or reviewing history  .      Diagnoses and all orders for this visit:    Sleep apnea    Hyperlipidemia, unspecified hyperlipidemia type          Subjective:   Chief Complaint   Patient presents with    Consult     MWM- Consult; Goal Wt 160lb ; Waist  57in( pt wheelchair bound);- Patient has sleep apnea       Patient ID: Nery Gutierrez  is a 63 y.o. female with excess weight/obesity here to pursue weight management.    Past Medical History:   Diagnosis Date    Bilateral foot-drop     Depression     Ear problems     Guillermo-Danlos syndrome     Family hx of colon cancer     Foot drop, bilateral     History of fall     HL (hearing loss)     Hyperlipidemia     Hypertension     Kidney stone     Neuropathy     Obesity     Peripheral neuropathy     severe    Sleep apnea 12/19/2023    Spinal stenosis     Suicide attempt (HCC)     Urinary incontinence        HPI: Here for MWM consult  She was seeing [facility] bariatrics in the past and did lose weight for her daughters wedding with a low carb diet. This was may 2022. She has gained since. She is no longer following same routine  Mid afternoon is usually concern with appetite and will snack then. Maybe interested in medcication to help with weight loss    Obesity/Excess Weight:  Severity:  class I with HTN, HLD and prediabetes  Onset:  whole life.     Modifiers: Diet and Exercise and Physician Supervised Weight Loss Program. Did low carb diet prior  and was seeing [facility]  Contributing factors: Stress/Emotional Eating  Associated symptoms: comorbid conditions, fatigue, and increased joint pain      Hydration:32 water and sometimes juice  Alcohol:   Exercise:nothing formal  Occupation:  Sleep:4-7 hours, interuppted  Dining out/takeout:1 x week    Food Recall  Breakfast: rice cake with 1 Tbsp Natural peanut butter, 1 cup blueberries  Snack: skip   Lunch: beef and bean burrito (microwaved)   Snack: cookies OR fruit  Dinner: chicken or fish, vegetable (brussel  sprouts or salad)  Snack: skip         The following portions of the patient's history were reviewed and updated as appropriate: She  has a past medical history of Bilateral foot-drop, Depression, Ear problems, Guillermo-Danlos syndrome, Family hx of colon cancer, Foot drop, bilateral, History of fall, HL (hearing loss), Hyperlipidemia, Hypertension, Kidney stone, Neuropathy, Obesity, Peripheral neuropathy, Sleep apnea (2023), Spinal stenosis, Suicide attempt (HCC), and Urinary incontinence.  She   Patient Active Problem List    Diagnosis Date Noted    Obesity, Class II, BMI 35-39.9 2023    Sleep apnea 2023    Cellulitis of left lower extremity 2018    Toe infection 2018    History of tibial fracture 2018    Depression, major, single episode, severe (HCC) 2018    Anxiety disorder 2018    Overdose 2018    Guillermo-Danlos disease 2018    Hyperlipidemia 2018    Prediabetes 2018     She  has a past surgical history that includes X-stop implantation;  section; Colonoscopy (); Back surgery; Fracture surgery; and pr colonoscopy flx dx w/collj spec when pfrmd (N/A, 2019).  Her family history includes Breast cancer (age of onset: 70) in her maternal aunt; No Known Problems in her daughter, maternal grandfather, maternal grandmother, paternal aunt, paternal grandfather, paternal grandmother, and sister; Prostate cancer in her father.  She  reports that she has never smoked. She has never used smokeless tobacco. She reports current alcohol use. She reports that she does not use drugs.  Current Outpatient Medications   Medication Sig Dispense Refill    ARIPiprazole (ABILIFY) 5 mg tablet Take 5 mg by mouth daily      Biotin 20518 MCG TBDP Take by mouth      DULoxetine (CYMBALTA) 60 mg delayed release capsule       Ferrous Sulfate (IRON PO) Take 65 mg by mouth      Multiple Vitamins-Minerals (CENTRUM SILVER PO) Take 1 tablet by mouth daily       nystatin (MYCOSTATIN) powder Apply topically 2 (two) times a day      OXcarbazepine (TRILEPTAL) 150 mg tablet Take 150 mg by mouth 2 (two) times a day      pravastatin (PRAVACHOL) 40 mg tablet Take 40 mg by mouth every other day      Cholecalciferol 2000 units TABS Take 2,000 Units by mouth (Patient not taking: Reported on 3/18/2022)       No current facility-administered medications for this visit.     Current Outpatient Medications on File Prior to Visit   Medication Sig    ARIPiprazole (ABILIFY) 5 mg tablet Take 5 mg by mouth daily    Biotin 85437 MCG TBDP Take by mouth    DULoxetine (CYMBALTA) 60 mg delayed release capsule     Ferrous Sulfate (IRON PO) Take 65 mg by mouth    Multiple Vitamins-Minerals (CENTRUM SILVER PO) Take 1 tablet by mouth daily    nystatin (MYCOSTATIN) powder Apply topically 2 (two) times a day    OXcarbazepine (TRILEPTAL) 150 mg tablet Take 150 mg by mouth 2 (two) times a day    pravastatin (PRAVACHOL) 40 mg tablet Take 40 mg by mouth every other day    Cholecalciferol 2000 units TABS Take 2,000 Units by mouth (Patient not taking: Reported on 3/18/2022)    [DISCONTINUED] DULoxetine (CYMBALTA) 20 mg capsule Take 1 capsule (20 mg total) by mouth 2 (two) times a day (Patient not taking: Reported on 3/18/2022)    [DISCONTINUED] lisinopril (ZESTRIL) 20 mg tablet Take 1 tablet by mouth daily (Patient not taking: Reported on 12/19/2023)    [DISCONTINUED] Motegrity 2 MG TABS TAKE 1 TABLET BY MOUTH EVERY MORNING (Patient not taking: Reported on 12/19/2023)    [DISCONTINUED] nitrofurantoin (MACROBID) 100 mg capsule TAKE 1 CAPSULE BY MOUTH TWICE A DAY FOR 7 DAYS (Patient not taking: No sig reported)    [DISCONTINUED] QUEtiapine (SEROquel) 25 mg tablet Take 25 mg by mouth daily at bedtime (Patient not taking: Reported on 3/18/2022)     No current facility-administered medications on file prior to visit.     She is allergic to ciprofloxacin..    Review of Systems   Constitutional:  Positive for  "fatigue. Negative for fever.   Respiratory:  Negative for shortness of breath.    Cardiovascular:  Negative for chest pain and palpitations.   Gastrointestinal:  Positive for constipation. Negative for abdominal pain, diarrhea and vomiting.   Genitourinary:  Negative for difficulty urinating.   Musculoskeletal:  Positive for arthralgias.   Skin:  Negative for rash.   Neurological:  Negative for headaches.   Psychiatric/Behavioral:  Positive for dysphoric mood. The patient is not nervous/anxious.        Objective:    /80 (BP Location: Left arm, Patient Position: Sitting)   Pulse 87   Ht 5' 6\" (1.676 m)   Wt 108 kg (237 lb 8 oz)   BMI 38.33 kg/m²     Physical Exam  Vitals and nursing note reviewed.   Constitutional:       General: She is not in acute distress.     Appearance: She is well-developed. She is obese.   HENT:      Head: Normocephalic and atraumatic.   Eyes:      Conjunctiva/sclera: Conjunctivae normal.   Neck:      Thyroid: No thyromegaly.   Pulmonary:      Effort: Pulmonary effort is normal. No respiratory distress.   Musculoskeletal:      Comments: wheelchair   Skin:     Findings: No rash (visible).   Neurological:      Mental Status: She is alert and oriented to person, place, and time.   Psychiatric:         Behavior: Behavior normal.         "

## 2023-12-19 NOTE — PATIENT INSTRUCTIONS
Try these alternative food options:  Protein shakes- Premier, Pure protein, Fairlife, Iconic  Lactose free protein shakes-  Fairlife, Ripple, Iconic, Taylor  Protein bars- Quest, Pure protein  Ice cream- Jose Armando's, Yasso, Enlightened, Halo Top   Chips- Quest protein chips, Cheese crisps (whisps), siete  Bread- 647 wheat, Protein Keto bread, whole wheat kilo bread, low carb/high protein wraps (mission or mitzi)  Pasta- Chickpea pasta, Pasta zero or similar  Rice- Cauliflower rice, quinoa, wild rice, brown rice  Fruits- berries, cantaloupe, peaches, apples, oranges  Yogurt- Ratio (25g protein), Skyr, Two good, Chobani 60 danika or 100 danika, Oikos triple zero  Sugar alternatives- Stevia, Monk fruit, Swerve

## 2023-12-20 ENCOUNTER — TELEPHONE (OUTPATIENT)
Dept: BARIATRICS | Facility: CLINIC | Age: 63
End: 2023-12-20

## 2023-12-20 NOTE — TELEPHONE ENCOUNTER
Called patient and left a message to give the office a call back to schedule her 4 month MWM follow up appointment with Abiel Sarabia PA-C.

## 2024-01-10 NOTE — TELEPHONE ENCOUNTER
CT renal stone study recommend by Dr. Chang Dustin to more definitively assess kidney stone burden. intact

## 2024-01-23 ENCOUNTER — TELEPHONE (OUTPATIENT)
Dept: BARIATRICS | Facility: CLINIC | Age: 64
End: 2024-01-23

## 2024-02-08 ENCOUNTER — TELEPHONE (OUTPATIENT)
Dept: BARIATRICS | Facility: CLINIC | Age: 64
End: 2024-02-08

## 2024-02-16 NOTE — PROGRESS NOTES
Weight Management Medical Nutrition Assessment    Zoila is present for medical menu planning. Today's wt: 240 (adjusted for 45# wheelchair). Is not interested in calorie counting at this time. Per diet recall, consuming excessive calories due to food choices, inadequate hydration. Developed and reviewed individualized low calorie meal plan. Will f/u in 1 mo.     Patient seen by Medical Provider in past 6 months:  yes 12/19/23 Abiel Sarabia PA-C   Requested to schedule appointment with Medical Provider: No      Anthropometric Measurements  Start Weight (#): 240 (2/21/24)  Current Weight (#): 240  TBW % Change from start weight: n/a  Ideal Body Weight (#): 130  Goal Weight (#):160  Highest: 255#  Lowest: 165#    Weight Loss History  Previous weight loss attempts: Did low carb diet prior  and [facility]    Food and Nutrition Related History  Wake up: varies   Bed Time:10:30-11pm     Food Recall  Breakfast: 10am mandarin orange or berries, peanut butter on WW toast     Snack:skip  Lunch: 1pm canned chicken or chix breast, salad, olive oil & balsamic OR 3 taquitos OR 1 slice pizza  Snack: carrots OR banana   Dinner: 6pm 3-4 oz chicken, rice, salad   Snack: skip      Beverages: 32 oz water w/ princess flavoring, ice drinks, and sometimes juice     Weekends: goes out to eat Saturday nights, order chinese food Fridays   Cravings: chinese food   Trouble area of day:midafternoon    Frequency of Eating out: 1-2x/wk  Food restrictions: dislikes cheese, yogurt  Cooking: self   Food Shopping: self    Physical Activity Intake  Activity:none  Physical limitations/barriers to exercise: wheelchair bound    Estimated Needs  Energy  SECA: BMR:n/a      X 1.3 -1000 =  [address] Energy Needs: BMR : 1657   1-2# loss weekly sedentary:  988-1488             1-2# loss weekly lightly active: 9086-5684  Maintenance calories for sedentary activity level: 1988  Protein:71-89      (1.2-1.5g/kg IBW)  Fluid: 69     (35mL/kg  IBW)    Nutrition Diagnosis  Yes;    Overweight/obesity  related to Excess energy intake as evidenced by  BMI more than normative standard for age and sex (obesity-grade II 35-39.9)       Nutrition Intervention    Nutrition Prescription  Calories:1000  Protein:70g  Fluid:64 oz    Meal Plan (Buzz/Pro/Carb)  Breakfast: 300/15  Snack: 100/10  Lunch: 300/20  Snack:   Dinner: 300/25  Snack:    Nutrition Education:    Calorie controlled menu  Lean protein food choices  Healthy snack options  Food journaling tips      Nutrition Counseling:  Strategies: meal planning, portion sizes, healthy snack choices, hydration, fiber intake, protein intake, exercise, food journal      Monitoring and Evaluation:  Evaluation criteria:  Energy Intake  Meet protein needs  Maintain adequate hydration  Monitor weekly weight  Meal planning/preparation  Food journal   Decreased portions at mealtimes and snacks  Physical activity     Barriers to learning:none  Readiness to change: Preparation:  (Getting ready to change)   Comprehension: good  Expected Compliance: good

## 2024-02-21 ENCOUNTER — CLINICAL SUPPORT (OUTPATIENT)
Dept: BARIATRICS | Facility: CLINIC | Age: 64
End: 2024-02-21

## 2024-02-21 VITALS
SYSTOLIC BLOOD PRESSURE: 122 MMHG | HEIGHT: 66 IN | WEIGHT: 239 LBS | BODY MASS INDEX: 38.41 KG/M2 | RESPIRATION RATE: 16 BRPM | HEART RATE: 80 BPM | TEMPERATURE: 98.3 F | DIASTOLIC BLOOD PRESSURE: 84 MMHG

## 2024-02-21 VITALS — BODY MASS INDEX: 38.57 KG/M2 | HEIGHT: 66 IN | WEIGHT: 240 LBS

## 2024-02-21 DIAGNOSIS — R63.5 ABNORMAL WEIGHT GAIN: Primary | ICD-10-CM

## 2024-02-21 PROCEDURE — WMDI30

## 2024-02-21 PROCEDURE — RECHECK

## 2024-02-21 RX ORDER — DOXYCYCLINE HYCLATE 20 MG
TABLET ORAL
COMMUNITY
Start: 2024-01-30

## 2024-02-21 NOTE — PROGRESS NOTES
Patient last visit weight:237lb  Patient current visit weight:239lb    If you are taking phentermine or other oral weight loss medications, are you experiencing any of the following symptoms:  Headache:   Blurred Vision:   Chest Pain:   Palpitations:  Insomnia:   SPECIFY ORAL MEDICATION AND DOSAGE:     If you are taking an injectable medication,  are you experiencing any of the following symptoms:  Bloating:   Nausea:  Vomiting:   Constipation:   Diarrhea:  SPECIFY INJECTABLE MEDICATION AND CURRENT   No medication    Vitals:    Is BP less than 100/60? No  Is BP greater than 140/90? No  Is HR greater than 100? No  **If yes to any of the above, have patient relax and repeat in 5-10 minutes**    Repeat values:    Is BP less than 100/60?  Is BP greater than 140/90?  Is HR greater than 100?  **If values remain outside of ranges above, please consult provider for next steps**

## 2024-03-05 ENCOUNTER — OFFICE VISIT (OUTPATIENT)
Dept: UROLOGY | Facility: AMBULATORY SURGERY CENTER | Age: 64
End: 2024-03-05
Payer: COMMERCIAL

## 2024-03-05 VITALS
RESPIRATION RATE: 18 BRPM | DIASTOLIC BLOOD PRESSURE: 78 MMHG | HEART RATE: 86 BPM | OXYGEN SATURATION: 96 % | SYSTOLIC BLOOD PRESSURE: 122 MMHG

## 2024-03-05 DIAGNOSIS — N20.0 NEPHROLITHIASIS: Primary | ICD-10-CM

## 2024-03-05 PROCEDURE — 99213 OFFICE O/P EST LOW 20 MIN: CPT

## 2024-03-05 NOTE — PROGRESS NOTES
Office Visit- Urology  Nery Gutierrez 1960 MRN: [id number]      Assessment/Discussion/Plan    63 y.o. female managed by     Nephrolithiasis  -CT stone study from 8/10/2023 demonstrated 3 right renal pelvic calculi measuring up to 1.3 cm with out hydronephrosis.  -Patient was previously recommended to have ureteroscopy procedure but decided not to proceed  -She presents to the office for follow-up  -Renal function is stable with a GFR calculated at 108 and a creatinine of 0.47 last measured in December 2023  -Last CT scan demonstrated right renal atrophy  -Did discuss that stones are unlikely to pass by themselves and the only way to have the removed to be through surgical intervention.  The risk of keeping him in place would include infection, scar tissue, right renal function deterioration.  Patient expressed understanding  -At the end of conversation today patient states that she would be leaning toward surgery.  She did not want to sign consent or initiate process to get scheduled until after repeat CT scan returned and patient has time to talk with her .  -We will have patient return to the office in a few weeks with CT stone study prior for updated imaging    2.  Urinary incontinence  -Stated that symptoms could be exacerbated by renal pelvic/ureteral stones which are likely still in place  -Patient inquiring about InterStim.  Stated that would consider surgical intervention to remove stones first and see if there is improvement in urinary incontinence and then have conversation about potential third line intervention as patient has previously trialed pharmacotherapy without benefit    Chief Complaint:   Nery is a 63 y.o. female presenting to the office for a follow up visit regarding nephrolithiasis        Subjective    Patient is a 63-year-old female who presents to the office for follow-up in regards to nephrolithiasis.  She had a CT scan that demonstrated 3 right renal pelvic calculi measuring 1.3  cm 1.0 cm and 9 mm with pelviectasis.  She last had a telemedicine visit in 2023 and is asymptomatic with no flank pain or lower urinary tract symptoms at that point in time.  She was recommended to have ureteroscopy with high-powered laser lithotripsy for right renal pelvic stones but patient wished to speak to her PCP before committing to proceeding and did not follow-up with a phone call on whether she wanted to proceed or not.  At appointment today patient denies any flank pain.  She denies dysuria or gross hematuria.  She states that she does have incontinence but this has been a chronic problem.         ROS:   Review of Systems   Constitutional: Negative.  Negative for chills, fatigue and fever.   HENT: Negative.     Respiratory:  Negative for shortness of breath.    Cardiovascular:  Negative for chest pain.   Gastrointestinal: Negative.  Negative for abdominal pain.   Endocrine: Negative.    Musculoskeletal: Negative.    Skin: Negative.    Neurological: Negative.  Negative for dizziness and light-headedness.   Hematological: Negative.    Psychiatric/Behavioral: Negative.           Past Medical History  Past Medical History:   Diagnosis Date    Bilateral foot-drop     Depression     Ear problems     Guillermo-Danlos syndrome     Family hx of colon cancer     Foot drop, bilateral     History of fall     HL (hearing loss)     Hyperlipidemia     Hypertension     Kidney stone     Neuropathy     Obesity     Peripheral neuropathy     severe    Sleep apnea 2023    Spinal stenosis     Suicide attempt (HCC)     Urinary incontinence        Past Surgical History  Past Surgical History:   Procedure Laterality Date    BACK SURGERY       SECTION      x 2    COLONOSCOPY      Normal with pour prep by Dr. Gramajo    FRACTURE SURGERY      tibial     [place] COLONOSCOPY FLX DX W/COLLJ SPEC WHEN PFRMD N/A 2019    Normal by Dr. Washington    X-STOP IMPLANTATION             Past Family History  Family History    Problem Relation Age of Onset    Prostate cancer Father     No Known Problems Sister     No Known Problems Daughter     No Known Problems Maternal Grandmother     No Known Problems Maternal Grandfather     No Known Problems Paternal Grandmother     No Known Problems Paternal Grandfather     Breast cancer Maternal Aunt 70    No Known Problems Paternal Aunt        Past Social history  Social History     Socioeconomic History    Marital status: /Civil Union     Spouse name: Not on file    Number of children: Not on file    Years of education: Not on file    Highest education level: Not on file   Occupational History    Not on file   Tobacco Use    Smoking status: Never    Smokeless tobacco: Never   Vaping Use    Vaping status: Never Used   Substance and Sexual Activity    Alcohol use: Yes     Comment: rare    Drug use: No    Sexual activity: Not Currently   Other Topics Concern    Not on file   Social History Narrative    Not on file     Social Determinants of Health     Financial Resource Strain: Not on file   Food Insecurity: Not on file   Transportation Needs: Not on file   Physical Activity: Not on file   Stress: Not on file   Social Connections: Not on file   Intimate Partner Violence: Not on file   Housing Stability: Not on file       Current Medications  Current Outpatient Medications   Medication Sig Dispense Refill    ARIPiprazole (ABILIFY) 5 mg tablet Take 5 mg by mouth daily      Biotin 10759 MCG TBDP Take by mouth      doxycycline (PERIOSTAT) 20 MG tablet ONE PILL TWICE DAILY WITH FOOD BY MOUTH      DULoxetine (CYMBALTA) 60 mg delayed release capsule       Ferrous Sulfate (IRON PO) Take 65 mg by mouth      metroNIDAZOLE (METROCREAM) 0.75 % cream APPLY TWICE A DAY TO FACE      Multiple Vitamins-Minerals (CENTRUM SILVER PO) Take 1 tablet by mouth daily      nystatin (MYCOSTATIN) powder Apply topically 2 (two) times a day      OXcarbazepine (TRILEPTAL) 150 mg tablet Take 150 mg by mouth 2 (two) times  "a day      pravastatin (PRAVACHOL) 40 mg tablet Take 40 mg by mouth every other day      Cholecalciferol 2000 units TABS Take 2,000 Units by mouth (Patient not taking: Reported on 3/18/2022)       No current facility-administered medications for this visit.       Allergies  Allergies   Allergen Reactions    Ciprofloxacin      \"shakes\"       OBJECTIVE    Vitals   Vitals:    03/05/24 1259   BP: 122/78   BP Location: Left arm   Patient Position: Sitting   Cuff Size: Standard   Pulse: 86   Resp: 18   SpO2: 96%       PVR:    Physical Exam  Constitutional:       General: She is not in acute distress.     Appearance: Normal appearance. She is normal weight. She is not ill-appearing or toxic-appearing.   HENT:      Head: Normocephalic and atraumatic.   Eyes:      Conjunctiva/sclera: Conjunctivae normal.   Cardiovascular:      Rate and Rhythm: Normal rate.   Pulmonary:      Effort: Pulmonary effort is normal. No respiratory distress.   Musculoskeletal:      Comments: In wheelchair   Skin:     General: Skin is warm and dry.   Neurological:      General: No focal deficit present.      Mental Status: She is alert and oriented to person, place, and time.      Cranial Nerves: No cranial nerve deficit.   Psychiatric:         Mood and Affect: Mood normal.         Behavior: Behavior normal.         Thought Content: Thought content normal.       Labs:     Lab Results   Component Value Date    CREATININE 0.47 12/18/2023      Lab Results   Component Value Date    HGBA1C 5.3 07/22/2022     Lab Results   Component Value Date    GLUCOSE 166 (H) 04/05/2018    CALCIUM 9.2 12/18/2023    K 4.1 12/18/2023    CO2 29 12/18/2023     12/18/2023    BUN 16 12/18/2023    CREATININE 0.47 12/18/2023       I have personally reviewed all pertinent lab results and reviewed with patient    Imaging   CT ABDOMEN AND PELVIS WITHOUT IV CONTRAST - LOW DOSE RENAL STONE     INDICATION:   N20.0: Calculus of kidney. Excerpt from recent Urology note: \"We " "discussed the fact that her ultrasound did not reveal any stones, however the KUB showed 1 cm calculus overlying the right UPJ. I do not personally visualize any stone when   viewing the KUB image.  I recommend obtaining a CT stone study to assess the size and location of her stone. \"     COMPARISON: CT renal protocol 11/20/2022. AP abdomen and renal ultrasound 7/5/2023.     TECHNIQUE:  Low radiation dose thin section CT examination of the abdomen and pelvis was performed without intravenous or oral contrast according to a protocol specifically designed to evaluate for urinary tract calculus.  Axial, sagittal, and coronal 2D   reformatted images were created from the source data and submitted for interpretation.  Evaluation for pathology in the abdomen and pelvis that is unrelated to urinary tract calculi is limited.     Radiation dose length product (DLP) for this visit:  675 mGy-cm .  This examination, like all CT scans performed in the [facility], was performed utilizing techniques to minimize radiation dose exposure, including the use of iterative   reconstruction and automated exposure control.     URINARY TRACT FINDINGS:     RIGHT KIDNEY AND URETER: Atrophic right kidney. 13 mm, 10 mm and 9 mm right renal pelvic calculi; #601/88. Mild right pelviectasis without hydronephrosis. Mild fat stranding about the right renal pelvis is likely reactive to the calculi.     LEFT KIDNEY AND URETER: No hydronephrosis or hydroureter. 1 mm nonobstructive lower pole calculus.     URINARY BLADDER:  Unremarkable.        ADDITIONAL FINDINGS:     LOWER CHEST:  No clinically significant abnormality identified in the visualized lower chest.     SOLID VISCERA: Limited low radiation dose noncontrast CT evaluation demonstrates no clinically significant abnormality of the imaged portions of the liver, spleen, pancreas, or adrenal glands.     GALLBLADDER/BILIARY TREE:  No calcified gallstones. No pericholecystic " inflammatory change.  No biliary dilatation.     STOMACH AND BOWEL: Moderate colonic stool burden. No bowel obstruction.     APPENDIX: Noninflamed     ABDOMINOPELVIC CAVITY:  No ascites.  No pneumoperitoneum.  No lymphadenopathy.     VESSELS: Unremarkable for patient's age.     REPRODUCTIVE ORGANS: Enlarged fibroid uterus. An IUD is present.     ABDOMINAL WALL/INGUINAL REGIONS:  We discussed the fact that her ultrasound did not reveal any stones, however the KUB showed 1 cm calculus overlying the right UPJ. I do not personally visualize any stone when viewing the KUB image.  I recommend   obtaining a CT stone study to assess the size and location of her stone. Extensive heterotopic ossification about the right hip unchanged from the comparison study.     OSSEOUS STRUCTURES: No acute fracture or destructive osseous lesion.  Spinal degenerative changes are noted.     IMPRESSION:     Three right renal pelvic calculi measuring up to 13 mm. Right pelviectasis without hydronephrosis. Fat stranding about the right renal pelvis is likely reactive to the calculi.     Moderate right renal atrophy.     The study was marked in EPIC for significant notification.           Workstation performed: [id number]       Joey Bradley PA-C  Date: 3/5/2024 Time: 3:40 PM  [facility]    This note was written using fluency dictation software. Please excuse any resulting minor grammatical errors.

## 2024-03-13 NOTE — PROGRESS NOTES
"Weight Management Medical Nutrition Assessment    Zoila is present for meal planning session. Today's wt: 237.5#, down 2.5# since initial visit. Zoila has been experiencing some challenges with going to social events and eating foods provided at potluck meals. Discussed bringing her own food to events for her to eat. Zoila has been trying to follow the meal plan we developed together, is not tracking or measuring her foods. Today we discussed importance of getting adequate protein at meals/snacks and explored ways to reduce the afternoon/evening snacking. Encouraged smooth to measure portions.      Patient seen by Medical Provider in past 6 months:  yes 12/19/23 Abiel Sarabia PA-C   Requested to schedule appointment with Medical Provider: No      Anthropometric Measurements  Start Weight (#): 240 (2/21/24)  Current Weight (#): 237.5  TBW % Change from start weight: n/a  Ideal Body Weight (#): 130  Goal Weight (#):160  Highest: 255#  Lowest: 165#    Weight Loss History  Previous weight loss attempts: Did low carb diet prior  and was seeing [facility] bariatrics    Food and Nutrition Related History  Wake up: varies   Bed Time:10:30-11pm     Food Recall  Breakfast: 10am high protein special k, blackberries OR 2x/wk scrambled eggs, peppers OR PB w/ whole wheat toast   Snack: PB on 1 whole wheat toast OR 1 piece fruit    Lunch: 1pm canned chicken or chix breast, salad, olive oil & balsamic, fruit OR tuna on whole wheat, pickles   Snack: carrots OR banana OR \"picking at nuts\"  Dinner: 6pm tilapia, asparagus  Snack: 1/2 chipwhich       Beverages: 32 oz water w/ princess flavoring, ice drinks, and sometimes juice     Weekends: goes out to eat Saturday nights, order chinese food Fridays   Cravings: chinese food   Trouble area of day:midafternoon    Frequency of Eating out: 1-2x/wk  Food restrictions: dislikes cheese, yogurt  Cooking: self   Food Shopping: self    Physical Activity Intake  Activity:none  Physical limitations/barriers to " exercise: wheelchair bound    Estimated Needs  Energy  SECA: BMR:n/a      X 1.3 -1000 =  Hayden Latif Energy Needs: BMR : 1657   1-2# loss weekly sedentary:  988-1488             1-2# loss weekly lightly active: 1694-7012  Maintenance calories for sedentary activity level: 1988  Protein:71-89      (1.2-1.5g/kg IBW)  Fluid: 69     (35mL/kg IBW)    Nutrition Diagnosis  Yes;    Overweight/obesity  related to Excess energy intake as evidenced by  BMI more than normative standard for age and sex (obesity-grade II 35-39.9)       Nutrition Intervention    Nutrition Prescription  Calories:1000  Protein:70g  Fluid:64 oz    Meal Plan (Buzz/Pro/Carb)  Breakfast: 300/15  Snack: 100/10  Lunch: 300/20  Snack:   Dinner: 300/25  Snack:    Nutrition Education:    Calorie controlled menu  Lean protein food choices  Healthy snack options  Food journaling tips      Nutrition Counseling:  Strategies: meal planning, portion sizes, healthy snack choices, hydration, fiber intake, protein intake, exercise, food journal      Monitoring and Evaluation:  Evaluation criteria:  Energy Intake  Meet protein needs  Maintain adequate hydration  Monitor weekly weight  Meal planning/preparation  Food journal   Decreased portions at mealtimes and snacks  Physical activity     Barriers to learning:none  Readiness to change: Action:  (Changing behavior)  Comprehension: good  Expected Compliance: good

## 2024-03-15 ENCOUNTER — HOSPITAL ENCOUNTER (OUTPATIENT)
Dept: MAMMOGRAPHY | Facility: MEDICAL CENTER | Age: 64
Discharge: HOME | End: 2024-03-15
Payer: COMMERCIAL

## 2024-03-15 VITALS — WEIGHT: 240 LBS | BODY MASS INDEX: 38.57 KG/M2 | HEIGHT: 66 IN

## 2024-03-15 DIAGNOSIS — Z12.31 SCREENING MAMMOGRAM FOR HIGH-RISK PATIENT: ICD-10-CM

## 2024-03-15 PROCEDURE — 77063 BREAST TOMOSYNTHESIS BI: CPT

## 2024-03-15 PROCEDURE — 77067 SCR MAMMO BI INCL CAD: CPT

## 2024-03-20 ENCOUNTER — CLINICAL SUPPORT (OUTPATIENT)
Dept: BARIATRICS | Facility: CLINIC | Age: 64
End: 2024-03-20

## 2024-03-20 VITALS — BODY MASS INDEX: 38.17 KG/M2 | WEIGHT: 237.5 LBS | HEIGHT: 66 IN

## 2024-03-20 DIAGNOSIS — R63.5 ABNORMAL WEIGHT GAIN: Primary | ICD-10-CM

## 2024-03-20 PROCEDURE — RECHECK

## 2024-03-20 PROCEDURE — WMDI30

## 2024-04-02 ENCOUNTER — HOSPITAL ENCOUNTER (OUTPATIENT)
Dept: RADIOLOGY | Age: 64
Discharge: HOME | End: 2024-04-02
Payer: COMMERCIAL

## 2024-04-02 DIAGNOSIS — N20.0 NEPHROLITHIASIS: ICD-10-CM

## 2024-04-02 PROCEDURE — 74176 CT ABD & PELVIS W/O CONTRAST: CPT

## 2024-04-08 ENCOUNTER — OFFICE VISIT (OUTPATIENT)
Dept: BARIATRICS | Facility: CLINIC | Age: 64
End: 2024-04-08
Payer: COMMERCIAL

## 2024-04-08 VITALS
SYSTOLIC BLOOD PRESSURE: 140 MMHG | DIASTOLIC BLOOD PRESSURE: 74 MMHG | BODY MASS INDEX: 38.82 KG/M2 | HEART RATE: 83 BPM | RESPIRATION RATE: 17 BRPM | WEIGHT: 240.5 LBS

## 2024-04-08 DIAGNOSIS — G47.30 SLEEP APNEA: ICD-10-CM

## 2024-04-08 DIAGNOSIS — R73.03 PREDIABETES: ICD-10-CM

## 2024-04-08 DIAGNOSIS — E66.9 OBESITY, CLASS II, BMI 35-39.9: Primary | ICD-10-CM

## 2024-04-08 DIAGNOSIS — E78.5 HYPERLIPIDEMIA, UNSPECIFIED HYPERLIPIDEMIA TYPE: ICD-10-CM

## 2024-04-08 PROCEDURE — 99214 OFFICE O/P EST MOD 30 MIN: CPT | Performed by: PHYSICIAN ASSISTANT

## 2024-04-08 NOTE — PROGRESS NOTES
Assessment and plan:     Nephrolithiasis  -CT scan on 8/10/2023 revealed right renal pelvic calculi measuring up to 1.3 cm with pelviectasis and atrophic right kidney  -Patient is willing to proceed with surgery at this point  -Plan for cystoscopy, right ureterscopy, Holmium laser lithotripsy, basket stone extraction, retrograde pyelogram, right stent insertion, consent obtained and signed, surgical risks discussed  -Urine culture obtained in office today, will follow results closely and treat accordingly  -Denies being on blood thinners  -Denies complications with anesthesia in the past  -History of LESLIE, does not utilize CPAP machine   -BMP on 12/18/2023: Creat 0.47, BUN 16, , repeat BMP  -Plan to follow up in office with KUB and ultrasound following surgery        2. Guillermo-Danlos disease  -Patient utilizes wheelchair for mobility, extreme atrophy in lower extremities   -Due to patient's underlying musculoskeletal disease would recommend patient not be done at [facility] due to transferring issues to and from wheelchair    History of Present Illness     Nery Gutierrez is a 64 y.o. female who presents today to the office for follow-up of nephrolithiasis.  Patient was last seen in March 2024.  It was discussed at the last appointment that patient had a 1.3 cm renal pelvic calculi with no hydronephrosis and an atrophic right kidney.  She was very reluctant to proceed with surgery at that time.  It was discussed that if she were to leave the kidney stone she would have increased risk of infection, scar tissue, and right renal function deterioration.  She wished to proceed with repeat imaging and return to the office to discuss the results.    Today in the office she states that she is overall feeling well. She denies pain, dysuria, hematuria, urgency, and frequency. She states that she is willing to proceed with surgery at this time.    Laboratory     Lab Results   Component Value Date    BUN 16 12/18/2023     "CREATININE 0.47 12/18/2023       No components found for: \"GFR\"    Lab Results   Component Value Date    GLUCOSE 166 (H) 04/05/2018    CALCIUM 9.2 12/18/2023    K 4.1 12/18/2023    CO2 29 12/18/2023     12/18/2023       Lab Results   Component Value Date    WBC 5.87 10/20/2022    HGB 12.1 10/20/2022    HCT 39.1 10/20/2022    MCV 98 10/20/2022     10/20/2022       Radiology       Review of Systems     Review of Systems   Constitutional:  Negative for chills and fever.   Respiratory: Negative.  Negative for cough and shortness of breath.    Cardiovascular:  Negative for chest pain and leg swelling.   Genitourinary:  Negative for dyspareunia, dysuria, flank pain, frequency, hematuria, menstrual problem, pelvic pain, urgency, vaginal bleeding, vaginal discharge and vaginal pain.   Skin:  Negative for rash.   Neurological: Negative.    Hematological:  Negative for adenopathy. Does not bruise/bleed easily.                   Allergies     Allergies   Allergen Reactions    Ciprofloxacin      \"shakes\"       Physical Exam     Physical Exam  Vitals reviewed.   Constitutional:       Appearance: Normal appearance.   HENT:      Head: Normocephalic and atraumatic.   Eyes:      Pupils: Pupils are equal, round, and reactive to light.   Cardiovascular:      Rate and Rhythm: Normal rate and regular rhythm.   Pulmonary:      Effort: Pulmonary effort is normal.      Breath sounds: Normal breath sounds.   Abdominal:      General: Abdomen is flat. There is no distension.      Palpations: Abdomen is soft.   Musculoskeletal:      Cervical back: Normal range of motion.   Skin:     General: Skin is warm and dry.   Neurological:      General: No focal deficit present.      Mental Status: She is alert and oriented to person, place, and time. Mental status is at baseline.      Motor: Weakness present.      Gait: Gait abnormal.   Psychiatric:         Mood and Affect: Mood normal.         Behavior: Behavior normal.         Thought " "Content: Thought content normal.         Judgment: Judgment normal.         Vital Signs     Vitals:    04/09/24 1300   BP: 136/74   BP Location: Left arm   Patient Position: Sitting   Cuff Size: Standard   Pulse: 68   SpO2: 96%   Weight: 109 kg (240 lb)   Height: 5' 6\" (1.676 m)       Current Medications       Current Outpatient Medications:     ARIPiprazole (ABILIFY) 5 mg tablet, Take 5 mg by mouth daily, Disp: , Rfl:     Biotin 68439 MCG TBDP, Take by mouth, Disp: , Rfl:     Cholecalciferol 2000 units TABS, Take 2,000 Units by mouth, Disp: , Rfl:     doxycycline (PERIOSTAT) 20 MG tablet, ONE PILL TWICE DAILY WITH FOOD BY MOUTH, Disp: , Rfl:     DULoxetine (CYMBALTA) 60 mg delayed release capsule, , Disp: , Rfl:     Ferrous Sulfate (IRON PO), Take 65 mg by mouth, Disp: , Rfl:     metroNIDAZOLE (METROCREAM) 0.75 % cream, APPLY TWICE A DAY TO FACE, Disp: , Rfl:     Multiple Vitamins-Minerals (CENTRUM SILVER PO), Take 1 tablet by mouth daily, Disp: , Rfl:     nystatin (MYCOSTATIN) powder, Apply topically 2 (two) times a day, Disp: , Rfl:     OXcarbazepine (TRILEPTAL) 150 mg tablet, Take 150 mg by mouth 2 (two) times a day, Disp: , Rfl:     pravastatin (PRAVACHOL) 40 mg tablet, Take 40 mg by mouth every other day, Disp: , Rfl:     Active Problems     Patient Active Problem List   Diagnosis    Overdose    Guillermo-Danlos disease    Hyperlipidemia    Prediabetes    Depression, major, single episode, severe (HCC)    Anxiety disorder    Cellulitis of left lower extremity    Toe infection    History of tibial fracture    Obesity, Class II, BMI 35-39.9    Sleep apnea       Past Medical History     Past Medical History:   Diagnosis Date    Bilateral foot-drop     Depression     Ear problems     Guillermo-Danlos syndrome     Family hx of colon cancer     Foot drop, bilateral     History of fall     HL (hearing loss)     Hyperlipidemia     Hypertension     Kidney stone     Neuropathy     Obesity     Peripheral neuropathy     severe "    Sleep apnea 2023    Spinal stenosis     Suicide attempt (HCC)     Urinary incontinence        Surgical History     Past Surgical History:   Procedure Laterality Date    BACK SURGERY       SECTION      x 2    COLONOSCOPY      Normal with pour prep by Dr. Gramajo    FRACTURE SURGERY      tibial     [place] COLONOSCOPY FLX DX W/COLLJ SPEC WHEN PFRMD N/A 2019    Normal by Dr. Felix HUIZARSTOP IMPLANTATION             Family History     Family History   Problem Relation Age of Onset    Prostate cancer Father     No Known Problems Sister     No Known Problems Daughter     No Known Problems Maternal Grandmother     No Known Problems Maternal Grandfather     No Known Problems Paternal Grandmother     No Known Problems Paternal Grandfather     Breast cancer Maternal Aunt 70    No Known Problems Paternal Aunt        Social History     Social History     Social History     Tobacco Use   Smoking Status Never   Smokeless Tobacco Never       Past Surgical History:   Procedure Laterality Date    BACK SURGERY       SECTION      x 2    COLONOSCOPY      Normal with pour prep by Dr. Gramajo    FRACTURE SURGERY      tibial     [place] COLONOSCOPY FLX DX W/COLLJ SPEC WHEN PFRMD N/A 2019    Normal by Dr. Felix AVINA IMPLANTATION               The following portions of the patient's history were reviewed and updated as appropriate: allergies, current medications, past family history, past medical history, past social history, past surgical history and problem list    Please note :  Voice dictation software has been used to create this document.  There may be inadvertent transcription errors.    TERESA Benoit

## 2024-04-08 NOTE — ASSESSMENT & PLAN NOTE
-Patient is pursuing [facility] and RD visits    Goals:  -Food log on paper.    - To try to drink at least 64oz of water daily.No sugary beverages.  -recommend trying to do daily chair exercises   -handout on dining out given    Initial:237.5  Current:240.8  Change:+3.3 lb  Goal:160    Not interested in adding on metformin or possible phentermine

## 2024-04-08 NOTE — PROGRESS NOTES
Assessment/Plan:    Obesity, Class II, BMI 35-39.9  -Patient is pursuing Conservative Program and RD visits    Goals:  -Food log on paper.    - To try to drink at least 64oz of water daily.No sugary beverages.  -recommend trying to do daily chair exercises   -handout on dining out given    Initial:237.5  Current:240.8  Change:+3.3 lb  Goal:160    Not interested in adding on metformin or possible phentermine    Prediabetes  Could consider metformin but blood sugar has been controlled    Hyperlipidemia  On pravastatin    Sleep apnea  Not currently treated  -should improve with weight loss, dietary, and lifestyle changes          Follow up in approximately  4 months   with Non-Surgical Physician/Advanced Practitioner and monthly weight checks.     Diagnoses and all orders for this visit:    Obesity, Class II, BMI 35-39.9    Prediabetes    Hyperlipidemia, unspecified hyperlipidemia type    Sleep apnea          Subjective:   Chief Complaint   Patient presents with    Follow-up     MWM- 4mth F/u, Waist 54in( wheelchair)        Patient ID: Nery Gutierrez  is a 64 y.o. female with excess weight/obesity here to pursue weight managment.  Patient is pursuing Conservative Program.     HPI  She did meet with RD and is trying to modify her diet.  Does feel she is frequently hungry.   Wt Readings from Last 10 Encounters:   04/08/24 109 kg (240 lb 8 oz)   03/20/24 108 kg (237 lb 8 oz)   03/15/24 109 kg (240 lb)   02/21/24 108 kg (239 lb)   02/21/24 109 kg (240 lb)   12/19/23 108 kg (237 lb 8 oz)   02/27/23 90.7 kg (200 lb)   03/18/22 90.7 kg (200 lb)   07/08/21 87.5 kg (193 lb)   01/18/19 97.5 kg (215 lb)       Food logging:  Increased appetite/cravings:  Exercise:home exercises  Hydration:24-32 oz      Diet recall:  B:fruit, 1 slice of WW bread with peanut butter  S:peanut butter. Today alejo snaps  L:salad w/chicken or tuna or egg  S: fruit  D:salmon/chicken w/vegetable  S: usually tries to avoid    The following portions of the  patient's history were reviewed and updated as appropriate: She  has a past medical history of Bilateral foot-drop, Depression, Ear problems, Guillermo-Danlos syndrome, Family hx of colon cancer, Foot drop, bilateral, History of fall, HL (hearing loss), Hyperlipidemia, Hypertension, Kidney stone, Neuropathy, Obesity, Peripheral neuropathy, Sleep apnea (2023), Spinal stenosis, Suicide attempt (HCC), and Urinary incontinence.  She   Patient Active Problem List    Diagnosis Date Noted    Obesity, Class II, BMI 35-39.9 2023    Sleep apnea 2023    Cellulitis of left lower extremity 2018    Toe infection 2018    History of tibial fracture 2018    Depression, major, single episode, severe (HCC) 2018    Anxiety disorder 2018    Overdose 2018    Guillermo-Danlos disease 2018    Hyperlipidemia 2018    Prediabetes 2018     She  has a past surgical history that includes X-stop implantation;  section; Colonoscopy (); Back surgery; Fracture surgery; and pr colonoscopy flx dx w/collj spec when pfrmd (N/A, 2019).  Her family history includes Breast cancer (age of onset: 70) in her maternal aunt; No Known Problems in her daughter, maternal grandfather, maternal grandmother, paternal aunt, paternal grandfather, paternal grandmother, and sister; Prostate cancer in her father.  She  reports that she has never smoked. She has never used smokeless tobacco. She reports current alcohol use. She reports that she does not use drugs.  Current Outpatient Medications   Medication Sig Dispense Refill    ARIPiprazole (ABILIFY) 5 mg tablet Take 5 mg by mouth daily      Biotin 11846 MCG TBDP Take by mouth      Cholecalciferol 2000 units TABS Take 2,000 Units by mouth      doxycycline (PERIOSTAT) 20 MG tablet ONE PILL TWICE DAILY WITH FOOD BY MOUTH      DULoxetine (CYMBALTA) 60 mg delayed release capsule       Ferrous Sulfate (IRON PO) Take 65 mg by mouth       metroNIDAZOLE (METROCREAM) 0.75 % cream APPLY TWICE A DAY TO FACE      Multiple Vitamins-Minerals (CENTRUM SILVER PO) Take 1 tablet by mouth daily      nystatin (MYCOSTATIN) powder Apply topically 2 (two) times a day      OXcarbazepine (TRILEPTAL) 150 mg tablet Take 150 mg by mouth 2 (two) times a day      pravastatin (PRAVACHOL) 40 mg tablet Take 40 mg by mouth every other day       No current facility-administered medications for this visit.     Current Outpatient Medications on File Prior to Visit   Medication Sig    ARIPiprazole (ABILIFY) 5 mg tablet Take 5 mg by mouth daily    Biotin 06418 MCG TBDP Take by mouth    Cholecalciferol 2000 units TABS Take 2,000 Units by mouth    doxycycline (PERIOSTAT) 20 MG tablet ONE PILL TWICE DAILY WITH FOOD BY MOUTH    DULoxetine (CYMBALTA) 60 mg delayed release capsule     Ferrous Sulfate (IRON PO) Take 65 mg by mouth    metroNIDAZOLE (METROCREAM) 0.75 % cream APPLY TWICE A DAY TO FACE    Multiple Vitamins-Minerals (CENTRUM SILVER PO) Take 1 tablet by mouth daily    nystatin (MYCOSTATIN) powder Apply topically 2 (two) times a day    OXcarbazepine (TRILEPTAL) 150 mg tablet Take 150 mg by mouth 2 (two) times a day    pravastatin (PRAVACHOL) 40 mg tablet Take 40 mg by mouth every other day     No current facility-administered medications on file prior to visit.     She is allergic to ciprofloxacin..    Review of Systems   Constitutional:  Negative for fever.   Respiratory:  Negative for shortness of breath.    Cardiovascular:  Negative for chest pain and palpitations.   Gastrointestinal:  Negative for abdominal pain.   Skin:  Negative for rash.   Neurological:  Negative for headaches.   Psychiatric/Behavioral:  Negative for dysphoric mood. The patient is not nervous/anxious.        Objective:    /74   Pulse 83   Resp 17   Wt 109 kg (240 lb 8 oz)   BMI 38.82 kg/m²      Physical Exam  Vitals and nursing note reviewed.   Constitutional:       General: She is not in acute  distress.     Appearance: She is well-developed. She is obese.   HENT:      Head: Normocephalic and atraumatic.   Eyes:      Conjunctiva/sclera: Conjunctivae normal.   Neck:      Thyroid: No thyromegaly.   Pulmonary:      Effort: Pulmonary effort is normal. No respiratory distress.   Skin:     Findings: No rash (visible).   Neurological:      Mental Status: She is alert and oriented to person, place, and time.   Psychiatric:         Mood and Affect: Mood normal.         Behavior: Behavior normal.

## 2024-04-09 ENCOUNTER — OFFICE VISIT (OUTPATIENT)
Dept: UROLOGY | Facility: AMBULATORY SURGERY CENTER | Age: 64
End: 2024-04-09
Payer: COMMERCIAL

## 2024-04-09 VITALS
HEART RATE: 68 BPM | BODY MASS INDEX: 38.57 KG/M2 | WEIGHT: 240 LBS | SYSTOLIC BLOOD PRESSURE: 136 MMHG | HEIGHT: 66 IN | OXYGEN SATURATION: 96 % | DIASTOLIC BLOOD PRESSURE: 74 MMHG

## 2024-04-09 DIAGNOSIS — Q79.60 EHLERS-DANLOS DISEASE: ICD-10-CM

## 2024-04-09 DIAGNOSIS — N20.0 NEPHROLITHIASIS: Primary | ICD-10-CM

## 2024-04-09 PROCEDURE — 87077 CULTURE AEROBIC IDENTIFY: CPT

## 2024-04-09 PROCEDURE — 99214 OFFICE O/P EST MOD 30 MIN: CPT

## 2024-04-09 PROCEDURE — 87086 URINE CULTURE/COLONY COUNT: CPT

## 2024-04-09 PROCEDURE — 87186 SC STD MICRODIL/AGAR DIL: CPT

## 2024-04-09 RX ORDER — CEFAZOLIN SODIUM 2 G/50ML
2000 SOLUTION INTRAVENOUS ONCE
OUTPATIENT
Start: 2024-04-09 | End: 2024-04-09

## 2024-04-10 ENCOUNTER — PREP FOR PROCEDURE (OUTPATIENT)
Dept: UROLOGY | Facility: AMBULATORY SURGERY CENTER | Age: 64
End: 2024-04-10

## 2024-04-10 ENCOUNTER — TELEPHONE (OUTPATIENT)
Dept: UROLOGY | Facility: AMBULATORY SURGERY CENTER | Age: 64
End: 2024-04-10

## 2024-04-10 DIAGNOSIS — N20.0 NEPHROLITHIASIS: Primary | ICD-10-CM

## 2024-04-10 DIAGNOSIS — Z01.810 PRE-OPERATIVE CARDIOVASCULAR EXAMINATION: ICD-10-CM

## 2024-04-10 DIAGNOSIS — R39.89 SUSPECTED UTI: ICD-10-CM

## 2024-04-10 NOTE — TELEPHONE ENCOUNTER
Called patient to schedule Cysto/ R URS / R RGP/ R stent. Patient was unable to answer call ut I was able to leave a voicemail asking patient to please give the office a call back to schedule. Office number was provided.

## 2024-04-10 NOTE — TELEPHONE ENCOUNTER
Patient returned call to schedule surgery. I offered patient date of 5/17/24 at the [facility] with Dr. Gavin. Patient was satisfied with offered date and location. I did let patient know that pre-op urine culture and testing have been ordered and need to be completed 2 weeks prior to surgery. All pre-op instructions were reviewed with patient. Surgery packet to be mailed to patients address on file as well as scanned into patients chart. I did ask that if patient has any future questions or concerns to please give the office a call. Patient verbalized understanding.

## 2024-04-11 ENCOUNTER — TELEPHONE (OUTPATIENT)
Dept: UROLOGY | Facility: AMBULATORY SURGERY CENTER | Age: 64
End: 2024-04-11

## 2024-04-11 DIAGNOSIS — N39.0 URINARY TRACT INFECTION WITHOUT HEMATURIA, SITE UNSPECIFIED: Primary | ICD-10-CM

## 2024-04-11 LAB — BACTERIA UR CULT: ABNORMAL

## 2024-04-11 RX ORDER — CEPHALEXIN 500 MG/1
500 CAPSULE ORAL EVERY 8 HOURS SCHEDULED
Qty: 21 CAPSULE | Refills: 0 | Status: SHIPPED | OUTPATIENT
Start: 2024-04-11 | End: 2024-04-18

## 2024-04-11 NOTE — TELEPHONE ENCOUNTER
LM for ruth ann that she has a UTI and that her medication was sent to [facility] in [place]  that looks like a mail order - if she has a local pharmacy will need to let us know. It s 7 days course and then we want her to get urine studies again prior to surgery 5/17  @5/4/24. She is to call back

## 2024-04-11 NOTE — TELEPHONE ENCOUNTER
Please let Nery know that her urine culture came back positive for bacteria. I will send an abx to her pharmacy on file. She should take the full course for 7 days and then have another urine culture 14 days (plan for May 2 or 3) prior to her surgery date which is scheduled for 5/17. I placed the order for the culture and she can go to any [facility] lab to have it taken. We will monitor those results closely and treat accordingly.

## 2024-05-03 ENCOUNTER — APPOINTMENT (OUTPATIENT)
Dept: LAB | Facility: CLINIC | Age: 64
End: 2024-05-03
Payer: COMMERCIAL

## 2024-05-03 ENCOUNTER — ANESTHESIA EVENT (OUTPATIENT)
Dept: PERIOP | Facility: AMBULARY SURGERY CENTER | Age: 64
End: 2024-05-03
Payer: COMMERCIAL

## 2024-05-03 DIAGNOSIS — N20.0 NEPHROLITHIASIS: ICD-10-CM

## 2024-05-03 DIAGNOSIS — Z01.810 PRE-OPERATIVE CARDIOVASCULAR EXAMINATION: ICD-10-CM

## 2024-05-03 DIAGNOSIS — R39.89 SUSPECTED UTI: ICD-10-CM

## 2024-05-03 LAB
ANION GAP SERPL CALCULATED.3IONS-SCNC: 7 MMOL/L (ref 4–13)
BUN SERPL-MCNC: 23 MG/DL (ref 5–25)
CALCIUM SERPL-MCNC: 9.7 MG/DL (ref 8.4–10.2)
CHLORIDE SERPL-SCNC: 105 MMOL/L (ref 96–108)
CO2 SERPL-SCNC: 28 MMOL/L (ref 21–32)
CREAT SERPL-MCNC: 0.43 MG/DL (ref 0.6–1.3)
GFR SERPL CREATININE-BSD FRML MDRD: 109 ML/MIN/1.73SQ M
GLUCOSE SERPL-MCNC: 115 MG/DL (ref 65–140)
POTASSIUM SERPL-SCNC: 3.9 MMOL/L (ref 3.5–5.3)
SODIUM SERPL-SCNC: 140 MMOL/L (ref 135–147)

## 2024-05-03 PROCEDURE — 87086 URINE CULTURE/COLONY COUNT: CPT

## 2024-05-03 PROCEDURE — 80048 BASIC METABOLIC PNL TOTAL CA: CPT

## 2024-05-03 PROCEDURE — 87186 SC STD MICRODIL/AGAR DIL: CPT

## 2024-05-03 PROCEDURE — 87077 CULTURE AEROBIC IDENTIFY: CPT

## 2024-05-03 PROCEDURE — 36415 COLL VENOUS BLD VENIPUNCTURE: CPT

## 2024-05-05 LAB
ATRIAL RATE: 82 BPM
BACTERIA UR CULT: ABNORMAL
P AXIS: 47 DEGREES
PR INTERVAL: 134 MS
QRS AXIS: -39 DEGREES
QRSD INTERVAL: 82 MS
QT INTERVAL: 386 MS
QTC INTERVAL: 450 MS
T WAVE AXIS: 49 DEGREES
VENTRICULAR RATE: 82 BPM

## 2024-05-05 PROCEDURE — 93010 ELECTROCARDIOGRAM REPORT: CPT | Performed by: INTERNAL MEDICINE

## 2024-05-06 ENCOUNTER — TELEPHONE (OUTPATIENT)
Dept: UROLOGY | Facility: AMBULATORY SURGERY CENTER | Age: 64
End: 2024-05-06

## 2024-05-06 DIAGNOSIS — N39.0 URINARY TRACT INFECTION WITHOUT HEMATURIA, SITE UNSPECIFIED: Primary | ICD-10-CM

## 2024-05-06 RX ORDER — SULFAMETHOXAZOLE AND TRIMETHOPRIM 800; 160 MG/1; MG/1
1 TABLET ORAL EVERY 12 HOURS SCHEDULED
Qty: 22 TABLET | Refills: 0 | Status: SHIPPED | OUTPATIENT
Start: 2024-05-06 | End: 2024-05-17

## 2024-05-06 RX ORDER — SULFAMETHOXAZOLE AND TRIMETHOPRIM 800; 160 MG/1; MG/1
1 TABLET ORAL EVERY 12 HOURS SCHEDULED
Qty: 22 TABLET | Refills: 0 | Status: SHIPPED | OUTPATIENT
Start: 2024-05-06 | End: 2024-05-06 | Stop reason: SDUPTHER

## 2024-05-06 NOTE — ADDENDUM NOTE
Addended by: DELIA THOMAS on: 5/6/2024 02:54 PM     Modules accepted: Orders    
Patient requests all Lab, Cardiology, and Radiology Results on their Discharge Instructions

## 2024-05-06 NOTE — TELEPHONE ENCOUNTER
Please send antibiotic to the following:  [id number] - [place] - [address] [place] [address] [[id number]]     It was sent to [facility] however pt. Asked that we remove that pharmacy, I removed it.

## 2024-05-06 NOTE — TELEPHONE ENCOUNTER
Please call Nery and let her know that the antibiotic was reordered and sent to [facility] in [place] on [address] per her request.  Thank you

## 2024-05-06 NOTE — TELEPHONE ENCOUNTER
Please call Nery and let her know that her urine culture remains positive for bacteria. I am going to send a different antibiotic to her pharmacy that she should start taking today and continue until the day of surgery.

## 2024-05-14 NOTE — PRE-PROCEDURE INSTRUCTIONS
Pre-Surgery Instructions:   Medication Instructions    ARIPiprazole (ABILIFY) 5 mg tablet Take day of surgery.    Cholecalciferol 2000 units TABS Stop taking 7 days prior to surgery.    doxycycline (PERIOSTAT) 20 MG tablet Hold day of surgery.    DULoxetine (CYMBALTA) 60 mg delayed release capsule Take day of surgery.    Ferrous Sulfate (IRON PO) Stop taking 7 days prior to surgery.    levonorgestrel (KYLEENA) 19.5 MG intrauterine device IUD implant    metroNIDAZOLE (METROCREAM) 0.75 % cream Uses PRN- DO NOT take day of surgery    Multiple Vitamins-Minerals (CENTRUM SILVER PO) Stop taking 7 days prior to surgery.    nystatin (MYCOSTATIN) powder Uses PRN- DO NOT take day of surgery    OXcarbazepine (TRILEPTAL) 150 mg tablet Take day of surgery.    pravastatin (PRAVACHOL) 40 mg tablet Take night before surgery    sulfamethoxazole-trimethoprim (BACTRIM DS) 800-160 mg per tablet Hold day of surgery.    Medication instructions for day surgery reviewed. Please use only a sip of water to take your instructed medications. Avoid all over the counter vitamins, supplements and NSAIDS for one week prior to surgery per anesthesia guidelines. Tylenol is ok to take as needed.     You will receive a call one business day prior to surgery with an arrival time and hospital directions. If your surgery is scheduled on a Monday, the hospital will be calling you on the Friday prior to your surgery. If you have not heard from anyone by 8pm, please call the hospital supervisor through the hospital  at [phone]. ([place] [phone] or [place] [phone]).    Do not eat or drink anything after midnight the night before your surgery, including candy, mints, lifesavers, or chewing gum. Do not drink alcohol 24hrs before your surgery. Try not to smoke at least 24hrs before your surgery.       Follow the pre surgery showering instructions as listed in the “My Surgical Experience Booklet” or otherwise provided by your surgeon's  office. Do not use a blade to shave the surgical area 1 week before surgery. It is okay to use a clean electric clippers up to 24 hours before surgery. Do not apply any lotions, creams, including makeup, cologne, deodorant, or perfumes after showering on the day of your surgery. Do not use dry shampoo, hair spray, hair gel, or any type of hair products.     No contact lenses, eye make-up, or artificial eyelashes. Remove nail polish, including gel polish, and any artificial, gel, or acrylic nails if possible. Remove all jewelry including rings and body piercing jewelry.     Wear causal clothing that is easy to take on and off. Consider your type of surgery.    Keep any valuables, jewelry, piercings at home. Please bring any specially ordered equipment (sling, braces) if indicated.    Arrange for a responsible person to drive you to and from the hospital on the day of your surgery. Please confirm the visitor policy for the day of your procedure when you receive your phone call with an arrival time.     Call the surgeon's office with any new illnesses, exposures, or additional questions prior to surgery.    Please reference your “My Surgical Experience Booklet” for additional information to prepare for your upcoming surgery.   without sedation

## 2024-05-16 ENCOUNTER — TELEPHONE (OUTPATIENT)
Dept: UROLOGY | Facility: AMBULATORY SURGERY CENTER | Age: 64
End: 2024-05-16

## 2024-05-16 NOTE — TELEPHONE ENCOUNTER
I called the patient at her request and we discussed her planned surgery tomorrow for uteroscopy.      She has a history of Guillermo-Danlos syndrome which I think is unlikely to affect our surgery.  She asked if we need to use different equipment I explained that there is only 1 set equipment used for ureteroscopy surgery.  We discussed her right kidney which has obvious atrophy likely from her persistent stone disease.  We also discussed that her stone burden is moderate to large in nature so may require more than 1 ureteroscopy to address.    Finally we discussed that she had a positive urine culture.  She has been on Bactrim antibiotic and is scheduled to finish her last dose tonight

## 2024-05-17 ENCOUNTER — TELEPHONE (OUTPATIENT)
Dept: UROLOGY | Facility: CLINIC | Age: 64
End: 2024-05-17

## 2024-05-17 ENCOUNTER — ANESTHESIA (OUTPATIENT)
Dept: PERIOP | Facility: AMBULARY SURGERY CENTER | Age: 64
End: 2024-05-17
Payer: COMMERCIAL

## 2024-05-17 ENCOUNTER — HOSPITAL ENCOUNTER (OUTPATIENT)
Facility: AMBULARY SURGERY CENTER | Age: 64
Setting detail: OUTPATIENT SURGERY
Discharge: HOME | End: 2024-05-17
Attending: UROLOGY | Admitting: UROLOGY
Payer: COMMERCIAL

## 2024-05-17 ENCOUNTER — APPOINTMENT (OUTPATIENT)
Dept: RADIOLOGY | Facility: AMBULARY SURGERY CENTER | Age: 64
End: 2024-05-17
Payer: COMMERCIAL

## 2024-05-17 VITALS
DIASTOLIC BLOOD PRESSURE: 80 MMHG | RESPIRATION RATE: 18 BRPM | WEIGHT: 230 LBS | BODY MASS INDEX: 36.1 KG/M2 | TEMPERATURE: 97.1 F | SYSTOLIC BLOOD PRESSURE: 155 MMHG | HEIGHT: 67 IN | OXYGEN SATURATION: 95 % | HEART RATE: 94 BPM

## 2024-05-17 DIAGNOSIS — N20.0 NEPHROLITHIASIS: Primary | ICD-10-CM

## 2024-05-17 PROCEDURE — C2617 STENT, NON-COR, TEM W/O DEL: HCPCS | Performed by: UROLOGY

## 2024-05-17 PROCEDURE — 52356 CYSTO/URETERO W/LITHOTRIPSY: CPT | Performed by: UROLOGY

## 2024-05-17 PROCEDURE — C1747 URETEROSCOPE DIGITAL FLEX SNGL USE RVS DEFLECTION APTRA: HCPCS | Performed by: UROLOGY

## 2024-05-17 PROCEDURE — NC001 PR NO CHARGE: Performed by: UROLOGY

## 2024-05-17 PROCEDURE — C1758 CATHETER, URETERAL: HCPCS | Performed by: UROLOGY

## 2024-05-17 PROCEDURE — 74420 UROGRAPHY RTRGR +-KUB: CPT

## 2024-05-17 PROCEDURE — C1769 GUIDE WIRE: HCPCS | Performed by: UROLOGY

## 2024-05-17 PROCEDURE — 87086 URINE CULTURE/COLONY COUNT: CPT | Performed by: UROLOGY

## 2024-05-17 PROCEDURE — C1894 INTRO/SHEATH, NON-LASER: HCPCS | Performed by: UROLOGY

## 2024-05-17 DEVICE — INLAY OPTIMA URETERAL STENT W/O GUIDEWIRE
Type: IMPLANTABLE DEVICE | Site: URETER | Status: FUNCTIONAL
Brand: BARD® INLAY OPTIMA® URETERAL STENT

## 2024-05-17 RX ORDER — HYDROMORPHONE HYDROCHLORIDE 2 MG/ML
INJECTION, SOLUTION INTRAMUSCULAR; INTRAVENOUS; SUBCUTANEOUS AS NEEDED
Status: DISCONTINUED | OUTPATIENT
Start: 2024-05-17 | End: 2024-05-17

## 2024-05-17 RX ORDER — ONDANSETRON 2 MG/ML
4 INJECTION INTRAMUSCULAR; INTRAVENOUS ONCE AS NEEDED
Status: DISCONTINUED | OUTPATIENT
Start: 2024-05-17 | End: 2024-05-17 | Stop reason: HOSPADM

## 2024-05-17 RX ORDER — SODIUM CHLORIDE, SODIUM LACTATE, POTASSIUM CHLORIDE, CALCIUM CHLORIDE 600; 310; 30; 20 MG/100ML; MG/100ML; MG/100ML; MG/100ML
INJECTION, SOLUTION INTRAVENOUS CONTINUOUS PRN
Status: DISCONTINUED | OUTPATIENT
Start: 2024-05-17 | End: 2024-05-17

## 2024-05-17 RX ORDER — MAGNESIUM HYDROXIDE 1200 MG/15ML
LIQUID ORAL AS NEEDED
Status: DISCONTINUED | OUTPATIENT
Start: 2024-05-17 | End: 2024-05-17 | Stop reason: HOSPADM

## 2024-05-17 RX ORDER — FENTANYL CITRATE/PF 50 MCG/ML
25 SYRINGE (ML) INJECTION
Status: DISCONTINUED | OUTPATIENT
Start: 2024-05-17 | End: 2024-05-17 | Stop reason: HOSPADM

## 2024-05-17 RX ORDER — MIDAZOLAM HYDROCHLORIDE 2 MG/2ML
INJECTION, SOLUTION INTRAMUSCULAR; INTRAVENOUS AS NEEDED
Status: DISCONTINUED | OUTPATIENT
Start: 2024-05-17 | End: 2024-05-17

## 2024-05-17 RX ORDER — ROCURONIUM BROMIDE 10 MG/ML
INJECTION, SOLUTION INTRAVENOUS AS NEEDED
Status: DISCONTINUED | OUTPATIENT
Start: 2024-05-17 | End: 2024-05-17

## 2024-05-17 RX ORDER — CEFAZOLIN SODIUM 2 G/50ML
2000 SOLUTION INTRAVENOUS ONCE
Status: COMPLETED | OUTPATIENT
Start: 2024-05-17 | End: 2024-05-17

## 2024-05-17 RX ORDER — ONDANSETRON 2 MG/ML
INJECTION INTRAMUSCULAR; INTRAVENOUS AS NEEDED
Status: DISCONTINUED | OUTPATIENT
Start: 2024-05-17 | End: 2024-05-17

## 2024-05-17 RX ORDER — HYDROMORPHONE HCL/PF 1 MG/ML
0.5 SYRINGE (ML) INJECTION
Status: DISCONTINUED | OUTPATIENT
Start: 2024-05-17 | End: 2024-05-17 | Stop reason: HOSPADM

## 2024-05-17 RX ORDER — GLYCOPYRROLATE 0.2 MG/ML
INJECTION INTRAMUSCULAR; INTRAVENOUS AS NEEDED
Status: DISCONTINUED | OUTPATIENT
Start: 2024-05-17 | End: 2024-05-17

## 2024-05-17 RX ORDER — OXYBUTYNIN CHLORIDE 5 MG/1
5 TABLET ORAL 3 TIMES DAILY PRN
Qty: 30 TABLET | Refills: 0 | Status: SHIPPED | OUTPATIENT
Start: 2024-05-17 | End: 2024-05-27

## 2024-05-17 RX ORDER — FENTANYL CITRATE 50 UG/ML
INJECTION, SOLUTION INTRAMUSCULAR; INTRAVENOUS AS NEEDED
Status: DISCONTINUED | OUTPATIENT
Start: 2024-05-17 | End: 2024-05-17

## 2024-05-17 RX ORDER — PROPOFOL 10 MG/ML
INJECTION, EMULSION INTRAVENOUS AS NEEDED
Status: DISCONTINUED | OUTPATIENT
Start: 2024-05-17 | End: 2024-05-17

## 2024-05-17 RX ORDER — CEFDINIR 300 MG/1
300 CAPSULE ORAL EVERY 12 HOURS SCHEDULED
Qty: 6 CAPSULE | Refills: 0 | Status: SHIPPED | OUTPATIENT
Start: 2024-05-17 | End: 2024-05-23

## 2024-05-17 RX ORDER — LABETALOL HYDROCHLORIDE 5 MG/ML
INJECTION, SOLUTION INTRAVENOUS AS NEEDED
Status: DISCONTINUED | OUTPATIENT
Start: 2024-05-17 | End: 2024-05-17

## 2024-05-17 RX ORDER — OXYCODONE HYDROCHLORIDE 5 MG/1
5 TABLET ORAL EVERY 4 HOURS PRN
Status: DISCONTINUED | OUTPATIENT
Start: 2024-05-17 | End: 2024-05-17 | Stop reason: HOSPADM

## 2024-05-17 RX ORDER — LIDOCAINE HYDROCHLORIDE 10 MG/ML
INJECTION, SOLUTION EPIDURAL; INFILTRATION; INTRACAUDAL; PERINEURAL AS NEEDED
Status: DISCONTINUED | OUTPATIENT
Start: 2024-05-17 | End: 2024-05-17

## 2024-05-17 RX ORDER — DEXAMETHASONE SODIUM PHOSPHATE 10 MG/ML
INJECTION, SOLUTION INTRAMUSCULAR; INTRAVENOUS AS NEEDED
Status: DISCONTINUED | OUTPATIENT
Start: 2024-05-17 | End: 2024-05-17

## 2024-05-17 RX ADMIN — FENTANYL CITRATE 50 MCG: 50 INJECTION INTRAMUSCULAR; INTRAVENOUS at 07:49

## 2024-05-17 RX ADMIN — LABETALOL HYDROCHLORIDE 5 MG: 5 INJECTION, SOLUTION INTRAVENOUS at 08:30

## 2024-05-17 RX ADMIN — ROCURONIUM BROMIDE 30 MG: 10 INJECTION, SOLUTION INTRAVENOUS at 07:42

## 2024-05-17 RX ADMIN — ONDANSETRON 4 MG: 2 INJECTION INTRAMUSCULAR; INTRAVENOUS at 08:31

## 2024-05-17 RX ADMIN — FENTANYL CITRATE 50 MCG: 50 INJECTION INTRAMUSCULAR; INTRAVENOUS at 07:52

## 2024-05-17 RX ADMIN — MIDAZOLAM 2 MG: 1 INJECTION INTRAMUSCULAR; INTRAVENOUS at 07:25

## 2024-05-17 RX ADMIN — CEFAZOLIN SODIUM 2000 MG: 2 SOLUTION INTRAVENOUS at 07:49

## 2024-05-17 RX ADMIN — SUGAMMADEX 200 MG: 100 INJECTION, SOLUTION INTRAVENOUS at 08:38

## 2024-05-17 RX ADMIN — LIDOCAINE HYDROCHLORIDE 100 MG: 10 INJECTION, SOLUTION EPIDURAL; INFILTRATION; INTRACAUDAL; PERINEURAL at 07:42

## 2024-05-17 RX ADMIN — HYDROMORPHONE HYDROCHLORIDE 0.5 MG: 2 INJECTION INTRAMUSCULAR; INTRAVENOUS; SUBCUTANEOUS at 08:09

## 2024-05-17 RX ADMIN — SODIUM CHLORIDE, SODIUM LACTATE, POTASSIUM CHLORIDE, AND CALCIUM CHLORIDE: .6; .31; .03; .02 INJECTION, SOLUTION INTRAVENOUS at 07:38

## 2024-05-17 RX ADMIN — GLYCOPYRROLATE 0.2 MG: 0.2 INJECTION INTRAMUSCULAR; INTRAVENOUS at 07:53

## 2024-05-17 RX ADMIN — PROPOFOL 200 MG: 10 INJECTION, EMULSION INTRAVENOUS at 07:42

## 2024-05-17 RX ADMIN — DEXAMETHASONE SODIUM PHOSPHATE 10 MG: 10 INJECTION, SOLUTION INTRAMUSCULAR; INTRAVENOUS at 07:42

## 2024-05-17 NOTE — H&P
"UROLOGY HISTORY AND PHYSICAL     Patient Identifiers: Nery Gutierrez (MRN [id number])      Date of Service: 5/17/2024        ASSESSMENT:     64 y.o. old female with atrophic right kidney and multiple right renal pelvis stones.      PLAN:     Right uteroscopy with laser lithotripsy      History of Present Illness:     Nery Gutierrez is a 64 y.o. old with a history of nephrolithiasis associated with atrophic right kidney with multiple right renal pelvis stones.      Patient was last seen in March 2024.  It was discussed at that appointment that patient had a 1.3 cm renal pelvic calculi with no hydronephrosis and an atrophic right kidney.      She was very reluctant to proceed with surgery at that time.  It was discussed that if she were to leave the kidney stone she would have increased risk of infection, scar tissue, and right renal function deterioration.  She wished to proceed with repeat imaging and return to the office to discuss the results.     At follow up appt in 4/9/24 she states that she is overall feeling well and wants to proceed with surgery at this time.       Past Medical, Past Surgical History:     Past Medical History:   Diagnosis Date    Arthritis     arms    Bilateral foot-drop     Blood in urine     Chronic pain disorder     \"neuropathy\"    Depression     Guillermo-Danlos syndrome     \"connective tissue disease\"    Exercises three times per week     leg and arm exercises    Family hx of colon cancer     Foot drop, bilateral     bilat leg braces to walk--uses Scooter if out of home    History of fall     HL (hearing loss)     wears hearing aids--bilat --able to hear Left    Hyperlipidemia     Hypertension     per pt does not have this-not on meds    Irritable bowel syndrome     in the past    IUD (intrauterine device) in place     \"to prevent cancer in uterus\"    Kidney stone     Neuropathy     Obesity     Peripheral neuropathy     severe    Sleep apnea 12/19/2023    no CPAP    Spinal stenosis     had " "surgery    Suicide attempt (HCC)     \"back in 2017\"---had seen a therapist -not currently\"    Urinary incontinence     uses adult depends    Wears glasses     Wheelchair dependence    :    Past Surgical History:   Procedure Laterality Date    BACK SURGERY       SECTION      x 2    COLONOSCOPY  2013    Normal with pour prep by Dr. Gramajo    FRACTURE SURGERY Left     tibial --metal danielle implanted    INSERTION OF INTRAUTERINE DEVICE (IUD)      IN COLONOSCOPY FLX DX W/COLLJ SPEC WHEN PFRMD N/A 2019    Normal by Dr. Washington    X-STOP IMPLANTATION      2017--pt not sure of this   :    Medications, Allergies:     Current Facility-Administered Medications:     ceFAZolin (ANCEF) IVPB (premix in dextrose) 2,000 mg 50 mL, 2,000 mg, Intravenous, Once, TERESA Benoit    Allergies:  Allergies   Allergen Reactions    Ciprofloxacin Other (See Comments)     \"shakes\"--\"like a convulsion\"   :    Social and Family History:   Social History:   Social History     Tobacco Use    Smoking status: Never    Smokeless tobacco: Never   Vaping Use    Vaping status: Never Used   Substance Use Topics    Alcohol use: Yes     Comment: on occas    Drug use: No   .    Social History     Tobacco Use   Smoking Status Never   Smokeless Tobacco Never       Family History:  Family History   Problem Relation Age of Onset    Stroke Mother     Prostate cancer Father     No Known Problems Sister     Colon cancer Brother     Colon polyps Brother     No Known Problems Maternal Grandmother     No Known Problems Maternal Grandfather     No Known Problems Paternal Grandmother     No Known Problems Paternal Grandfather     No Known Problems Daughter     Breast cancer Maternal Aunt 70    No Known Problems Paternal Aunt    :     Review of Systems:     General: Fever, chills, or night sweats: negative  Cardiac: Negative for chest pain.    Pulmonary: Negative for shortness of breath.  Gastrointestinal: Abdominal pain negative  Nausea, vomiting, or " "diarrhea negative  Genitourinary: See HPI above.  Patient does nothave hematuria.  All other systems queried were negative.    Physical Exam:   General: Patient is pleasant and in NAD. Awake and alert  /72   Pulse 97   Temp 98.7 °F (37.1 °C) (Temporal)   Resp 16   Ht 5' 7\" (1.702 m)   Wt 104 kg (230 lb)   SpO2 95%   BMI 36.02 kg/m²   HEENT:  Normocephalic atraumatic  Cardiac:  Regular rate and rhythm, Peripheral edema: negative  Pulmonary: Non-labored breathing, CTAB  Abdomen: Soft, non-tender, non-distended.  No surgical scars.  No masses, tenderness, hernias noted.    Genitourinary: negative CVA tenderness, neg suprapubic tenderness.  Extremities: normal movement in all 4       Labs:     Lab Results   Component Value Date    HGB 12.1 10/20/2022    HCT 39.1 10/20/2022    WBC 5.87 10/20/2022     10/20/2022   ]    Lab Results   Component Value Date    K 3.9 05/03/2024     05/03/2024    CO2 28 05/03/2024    BUN 23 05/03/2024    CREATININE 0.43 (L) 05/03/2024    CALCIUM 9.7 05/03/2024    GLUCOSE 166 (H) 04/05/2018   ]    Imaging:   I personally reviewed the images and report of the following studies, and reviewed them with the patient:    CT scan showing atrophic right kidney with 3 stones in the renal pelvis    Thank you for allowing me to participate in this patients’ care.  Please do not hesitate to call with any additional questions.  Maycol Gavin MD      "

## 2024-05-17 NOTE — OP NOTE
OPERATIVE REPORT  PATIENT NAME: Nery Gutierrez    :  1960  MRN: [id number]  Pt Location: [facility] OR [facility]OM 04    SURGERY DATE: 2024    Surgeons and Role:     * Maycol Gavin MD - Primary    Preop Diagnosis:  Right renal pelvis stone    Post-Op Diagnosis Codes:     * Nephrolithiasis [N20.0]  Renal pelvis stones x 3 measuring 5 cubic centimeters  Possible right UPJ obstruction    Procedure(s):  Right - CYSTOSCOPY URETEROSCOPY WITH LITHOTRIPSY HOLMIUM LASER. RETROGRADE PYELOGRAM AND INSERTION STENT URETERAL    Specimen(s):  ID Type Source Tests Collected by Time Destination   A :  Urine Urine, Cystoscopic URINE CULTURE Maycol Gavin MD 2024 [id number]        Estimated Blood Loss:   Minimal    Drains:  Ureteral Internal Stent Right ureter 6 Fr. (Active)   Number of days: 0       Anesthesia Type:   General    Operative Indications:  Atrophic right kidney with multiple right renal pelvis stones measuring approximately 1.5 to 2 cm in total longitudinal direction    Operative Findings:  Right UPJ appeared tapered on retrograde concerning for possible UPJ obstruction.  UPJ area appeared inflamed but the scope able to pass through fairly easily.  Severe right-sided hydronephrosis  Multiple yellow-brown stones seen in the renal pelvis and a chronically dilated system which were fragmented to dust.    Complications:   None    Procedure and Technique:  After informed consent including the risks of bleeding, infection, ureteral injury, and need for secondary procedures, patient was placed supine in the operating room theater.  Gen. anesthesia was administered.      They were then placed in the dorsal lithotomy position and sterilely prepped and draped in usual fashion. Antibiotic prophylaxis and DVT prophylaxis were administered Cystoscopy was performed the 21 [place] cystoscope 30° lens.  This revealed a normal urethra.  Inspection of the bladder revealed no abnormalities.  Fluoroscopy did not show evidence of a  stone.    Attention was turned to the right ureteral orifice. A 5fr open ended catheter was attempted to be passed into the ureteral orifice. A retrograde ureteropyelogram was performed showing moderate proximal hydro-ureteronephrosis without obvious filing defect. Then a 0.38 sensor guidewire was passed through the open ended catheter and up the ureter into the renal pelvis.  A dual-lumen catheter was then passed in without resistance and a secondary solo guidewire placed.      A 10/12 access sheath was passed up sequentially over one of the two wires under fluoroscopic guidance without any significant resistance felt with passage of the inner or inner and outer sheath combined so that the endwas in the proximal ureter.  A 8fr digital flexible ureteroscope was then passed through the access sheath into the proximal ureter. The proximal ureter appeared inflamed with edema but the scope was able to pass through without any significant resistance.  The collecting system was entered.  It appeared chronically dilated.  3 stones were seen in the collecting system each yellow-brown and measuring 1 to 2 cm via 7 to 14 mm each.  Total size of the stones appear to be about 5 cm³.      These were fragmented with the use of a 270 micron holmium laser fiber at combination of settings of 0.5J and 40Hz and 1.5J and 25Hz.  This was done until all stone had been turned into dust.  The collecting system was examined again and no significant residual stone fragments were appreciated.  Hemostasis was appropriate.       The ureteroscope was backed down the ureter under vision and there were no residual fragments and the ureter was noted to be intact with no injury the proximal ureter appeared tighter than the remainder of the ureter but not obviously stricture.  Cystoscope was reassembled over the guidewire and a 6 x 24 double-J stent inserted without difficulty. The string was not left on.  The bladder was drained.   The patient was  placed back supine, awakened from general anesthesia and brought to recovery room in stable condition.    PLAN:  The patient will have her stent removed in about 10 days.  They will then follow up with a KUB and ultrasound approximately 6 weeks later.     A qualified resident physician was not available.    Patient Disposition:  PACU         SIGNATURE: Maycol Gavin MD  DATE: May 17, 2024  TIME: 8:45 AM

## 2024-05-17 NOTE — ANESTHESIA PREPROCEDURE EVALUATION
Procedure:  CYSTOSCOPY URETEROSCOPY WITH LITHOTRIPSY HOLMIUM LASER, RETROGRADE PYELOGRAM AND INSERTION STENT URETERAL (Right: Bladder)    Relevant Problems   CARDIO   (+) Hyperlipidemia      NEURO/PSYCH   (+) Anxiety disorder   (+) Depression, major, single episode, severe (HCC)      PULMONARY   (+) Sleep apnea        Physical Exam    Airway    Mallampati score: III  TM Distance: >3 FB  Neck ROM: limited     Dental   No notable dental hx     Cardiovascular  Rhythm: regular, Rate: normal    Pulmonary   Breath sounds clear to auscultation    Other Findings  post-pubertal.      Anesthesia Plan  ASA Score- 3     Anesthesia Type- general with ASA Monitors.         Additional Monitors:     Airway Plan: LMA.           Plan Factors-Exercise tolerance (METS): >4 METS.    Chart reviewed. EKG reviewed.  Existing labs reviewed. Patient summary reviewed.    Patient is not a current smoker.              Induction- intravenous.    Postoperative Plan- Plan for postoperative opioid use.     Perioperative Resuscitation Plan - Level 1 - Full Code.       Informed Consent- Anesthetic plan and risks discussed with patient and spouse.  I personally reviewed this patient with the CRNA. Discussed and agreed on the Anesthesia Plan with the CRNA..

## 2024-05-17 NOTE — TELEPHONE ENCOUNTER
Post Op Note    Nery Gutierrez is a 64 y.o. female s/p   CYSTOSCOPY URETEROSCOPY WITH LITHOTRIPSY HOLMIUM LASER, RETROGRADE PYELOGRAM AND INSERTION STENT URETERAL (Right: Bladder)    performed 05/17/2024.  Nery Gutierrez is a patient of Dr. Dr. Gavin and is seen at the [facility] office.     How would you rate your pain on a scale from 1 to 10, 10 being the worst pain ever? 0  Have you had a fever? No  Do you have any difficulty urinating? No  Do you have any other questions or concerns that I can address at this time?      -Post op expectations discussed  -SS of infection discussed. Advised to call with any.  -Encouraged water intake of 8-10 glasses a day  -Medications reviewed  -Appts confirmed and advised to contact CS to schedule CT in 4 weeks.  No further questions. Pt verbalized understanding and was thankful for call.

## 2024-05-17 NOTE — TELEPHONE ENCOUNTER
Patient underwent right uteroscopy for multiple right renal pelvis stones associated with severe hydronephrosis and a atrophic kidney.    Retrograde pyelogram was initially concerning for possible UPJ obstruction as there was a tapered ureter and severe hydronephrosis.    Upon ureteroscopy the proximal ureter and UPJ area appeared inflamed and mildly tight but the scope was able to pass to fairly easily.    Multiple 1 cm stones in the renal pelvis were fragmented to dust.    Stent x 1-2 weeks.    Plan for CT scan to follow-up given the concern for UPJ obstruction and likely chronic hydronephrosis to some extent

## 2024-05-17 NOTE — DISCHARGE INSTR - AVS FIRST PAGE
Nery Gutierrez:    Your surgery went very well.  Your stone was fragmented to small pieces and subsequent fragments extracted via basket such that a residual stone should be small like grains of sand that can pass on their own.    We will remove your stent in clinic via cystoscopy.  This is a quick outpatient procedure for which you are awake and has mild discomfort.    Please plan to take an antibiotic for the next 3 days and then again around the stent removal starting the day before, the day of and finishing the day after stent removal.    It is important to ensure you have healed well from surgery and therefore we will plan for a CT scan approximately 4-6 weeks after the stent is removed.    Please take your medications as prescribed with caution for comfort.  Most importantly please drink 6-8 glasses of water per day    Please call with any questions or concerns.    Maycol Gavin MD  [facility]  [phone]            WHAT IS A STENT?  At the end of the procedure, your doctor may place a stent into your ureter. A stent is a thin, flexible piece of plastic that will hold open your ureter while the remaining small pieces of stone pass. This allows your kidney to drain easily and prevents you from having to “pass” these small stone pieces on your own, which could be painful. The stent is about 12 inches long and looks and feels like a thin piece of spaghetti.    AFTER THE PROCEDURE  After the procedure you may experience the following symptoms. All of these are normal and should resolve within 1 or 2 days after your stent is removed.  Urinary frequency (urinating more often than usual)  Urinary urgency (the sensation that you need to urinate right away)  Painful urination (this can be pain in your bladder or in your back when  you urinate)  Blood in your urine ( a stent can irritate the lining of your bladder causing it to bleed)  Back/Flank pain, especially with urination    You may receive a  prescription for narcotic pain medication after the procedure. You will also receive a prescription for tamsulosin which you will take once a day for 2 weeks to help relax your ureter and decrease stent discomfort. You will also need to purchase a stool softener (i.e. Colace) or mild laxative (i.e. Miralax) as the narcotic pain medication can make you constipated. This is important as constipation can exacerbate stent related symptoms.     STENT REMOVAL  In some cases, your doctor will leave strings attached to your stent. The strings will be taped to your skin after the procedure. The strings will allow you to remove the thin flexible stent while you are at home. Normally, the stent can be removed 3-5 days after your procedure; your physician will tell you the specific date after your procedure.     On the day you are supposed to remove your stent, do the following:  When you wake up in the morning, take 1-2 pain pills with food.  Start your antibiotic pill the morning of schedule stent removal if prescribed  One hour later sit on the toilet or in the bath tub.  Take a deep breath in and while exhaling, pull the string.   Dispose of the stent in the garbage.    Alternatively, you will come back for an office procedure to remove the stent by placing a small camera into your bladder to remove the stent.    During the next 4-8 hours after removing your stent, you may experience additional blood in your urine, pain with urination or back/side pain. You should take the pain medication you were prescribed to help you with the pain, as well as continue the Flomax. If the pain is severe, you are vomiting, and/or have a fever > 101.4 please call the clinic.

## 2024-05-19 LAB — BACTERIA UR CULT: NORMAL

## 2024-06-05 ENCOUNTER — PROCEDURE VISIT (OUTPATIENT)
Dept: UROLOGY | Facility: CLINIC | Age: 64
End: 2024-06-05
Payer: COMMERCIAL

## 2024-06-05 VITALS
OXYGEN SATURATION: 97 % | HEART RATE: 83 BPM | DIASTOLIC BLOOD PRESSURE: 80 MMHG | HEIGHT: 67 IN | BODY MASS INDEX: 36.02 KG/M2 | SYSTOLIC BLOOD PRESSURE: 120 MMHG

## 2024-06-05 DIAGNOSIS — Z96.0 RETAINED URETERAL STENT: Primary | ICD-10-CM

## 2024-06-05 PROCEDURE — 52310 CYSTOSCOPY AND TREATMENT: CPT | Performed by: PHYSICIAN ASSISTANT

## 2024-06-05 RX ORDER — SULFAMETHOXAZOLE AND TRIMETHOPRIM 800; 160 MG/1; MG/1
1 TABLET ORAL EVERY 12 HOURS SCHEDULED
Qty: 14 TABLET | Refills: 0 | Status: SHIPPED | OUTPATIENT
Start: 2024-06-05 | End: 2024-06-05

## 2024-06-05 NOTE — PROGRESS NOTES
Cystoscopy     Date/Time  6/5/2024 10:00 AM     Performed by  Ryan Lee PA-C   Authorized by  Ryan Lee PA-C     Universal Protocol:  Consent: Written consent obtained.  Patient identity confirmed: verbally with patient      Procedure Details:  Procedure type: simple removal of a foreign body, stone, or stent    Patient tolerance: Patient tolerated the procedure well with no immediate complications    Additional Procedure Details: 64-year-old female with right ureteroscopy now here for stent removal.  Patient is placed in the dorsolithotomy position.  Groin is prepped and draped in usual fashion.  Flexible cystoscope was passed per the meatus.  After adequate filling the stent is identified grasped with forceps removed without difficulty.  The urine was cloudy with debris.  Patient is made aware of postprocedure care.  Signs symptoms of post stent removal that may occur and understands.  She is scheduled for a CAT scan in about 6 weeks.  There is some concern for chronic UPJ obstruction.  Will call with the results and schedule appropriate follow-up.

## 2024-07-08 ENCOUNTER — TELEPHONE (OUTPATIENT)
Dept: UROLOGY | Facility: AMBULATORY SURGERY CENTER | Age: 64
End: 2024-07-08

## 2024-07-08 NOTE — TELEPHONE ENCOUNTER
Call placed- [facility] stating patient has an appt 7/16 for a CT scan result follow up.  Patient never got it done nor is scheduled to get it done.  I told patient to call central scheduling at [phone] to schedule scan and get it done prior to OV.  Left office # [phone].

## 2024-07-10 ENCOUNTER — TELEPHONE (OUTPATIENT)
Dept: BARIATRICS | Facility: CLINIC | Age: 64
End: 2024-07-10

## 2024-07-15 NOTE — TELEPHONE ENCOUNTER
Spoke with pt regarding CT scan result f/u appt. Pt is scheduled for CT scan on 7/22. Looked for appts and next available is in the fall. Please review to see if sooner appt available to review CT results.     Pt call back- [phone]

## 2024-07-22 ENCOUNTER — HOSPITAL ENCOUNTER (OUTPATIENT)
Dept: CT IMAGING | Facility: HOSPITAL | Age: 64
Discharge: HOME | End: 2024-07-22
Attending: UROLOGY
Payer: COMMERCIAL

## 2024-07-22 DIAGNOSIS — N20.0 NEPHROLITHIASIS: ICD-10-CM

## 2024-07-22 PROCEDURE — 74176 CT ABD & PELVIS W/O CONTRAST: CPT

## 2024-08-06 ENCOUNTER — OFFICE VISIT (OUTPATIENT)
Dept: UROLOGY | Facility: AMBULATORY SURGERY CENTER | Age: 64
End: 2024-08-06

## 2024-08-06 VITALS
DIASTOLIC BLOOD PRESSURE: 78 MMHG | BODY MASS INDEX: 37.67 KG/M2 | OXYGEN SATURATION: 97 % | WEIGHT: 240 LBS | SYSTOLIC BLOOD PRESSURE: 140 MMHG | HEART RATE: 82 BPM | HEIGHT: 67 IN

## 2024-08-06 DIAGNOSIS — N20.0 NEPHROLITHIASIS: Primary | ICD-10-CM

## 2024-08-06 DIAGNOSIS — G95.9 CERVICAL MYELOPATHY (HCC): ICD-10-CM

## 2024-08-06 DIAGNOSIS — N13.5 UPJ OBSTRUCTION, ACQUIRED: ICD-10-CM

## 2024-08-06 NOTE — PROGRESS NOTES
Assessment and plan:     Nephrolithiasis  CT scan from 4/2/2022 demonstrated 3 renal calculi in the renal pelvis measuring as big as 11 mm  Underwent ureteroscopy with Dr. Gavin on 5/17/2024 during procedure was found that she may have possible UPJ obstruction, she did have severe right sided hydronephrosis and multiple yellow/brown stones seen in the renal pelvis  CT stone study from 7/22/2024 demonstrated resolution of previous right renal stone burden and few small stones in the lower pole, continues with mild right hydronephrosis  Discussed her small renal calculi that are still present and do not require any intervention at this time  Discussed increasing water and fluid intake to at least 2 L of water daily  Continue with diet lifestyle modifications  Follow-up in 6 months with kidney and bladder ultrasound    UPJ obstruction, acquired  CT from 7/22/2024 continues with mild right hydronephrosis, if she becomes symptomatic can consider workup with MAG3 Lasix renal scan but if remains asymptomatic can continue with observation as she probably has significantly reduced function of the right kidney based on the atrophic appearance.  Dr. Gavin did also personally review these images and does not believe that there is a strong role for pyeloplasty surgery at this time.    Cervical myelopathy (HCC)  Continue to follow with PCP      History of Present Illness     Nery Gutierrez is a 64 y.o. female who presents today to the office for follow-up of nephrolithiasis.  She underwent ureteroscopy with Dr. Gavin on 5/17/2024 in which she was able to remove the majority of the stone burden in the renal pelvis.  She continues with small kidney stones in the right lower pole.  During her procedure was also found that she did have right UPJ obstruction and she continues with mild hydronephrosis that was noted on her most recent CT scan on 7/22/2024.  Dr. Gavin did personally review these images and does not believe that there  "is a role for pyeloplasty at this time due to an already atrophic appearing right kidney.    Today in the office she states she is overall doing very well.  She states that she did not have any difficulty following her procedure.  She also states that she did not have any difficulty with the stent removal.  She is very satisfied that most of her kidney stone burden is now resolved.  She did state that she tries to keep up on her water intake but it is very difficult for her.  We did briefly discuss referral to nephrology if she continues to develop more kidney stones, will defer at this time.  She denies any flank pain.  We did discuss that she does have a UPJ obstruction that was noted during her ureteroscopy but due to her atrophic right kidney would not recommend any additional intervention at this time, she is understanding of this.    She denies any other urinary/urological complaints.    Laboratory     Lab Results   Component Value Date    BUN 23 05/03/2024    CREATININE 0.43 (L) 05/03/2024       No components found for: \"GFR\"    Lab Results   Component Value Date    GLUCOSE 166 (H) 04/05/2018    CALCIUM 9.7 05/03/2024    K 3.9 05/03/2024    CO2 28 05/03/2024     05/03/2024       Lab Results   Component Value Date    WBC 5.87 10/20/2022    HGB 12.1 10/20/2022    HCT 39.1 10/20/2022    MCV 98 10/20/2022     10/20/2022       No results found for: \"PSA\"    No results found for this or any previous visit (from the past 1 hour(s)).    Review of Systems     Review of Systems   Constitutional:  Negative for chills and fever.   Respiratory: Negative.  Negative for cough and shortness of breath.    Cardiovascular:  Negative for chest pain and leg swelling.   Genitourinary:  Negative for dyspareunia, dysuria, flank pain, frequency, hematuria, menstrual problem, pelvic pain, urgency, vaginal bleeding, vaginal discharge and vaginal pain.   Skin:  Negative for rash.   Neurological: Negative.    Hematological:  " "Negative for adenopathy. Does not bruise/bleed easily.         Allergies     Allergies   Allergen Reactions    Ciprofloxacin Other (See Comments)     \"shakes\"--\"like a convulsion\"       Physical Exam     Physical Exam  Vitals reviewed.   Constitutional:       Appearance: Normal appearance.   HENT:      Head: Normocephalic and atraumatic.   Eyes:      Pupils: Pupils are equal, round, and reactive to light.   Cardiovascular:      Rate and Rhythm: Normal rate.   Pulmonary:      Effort: Pulmonary effort is normal.   Musculoskeletal:      Cervical back: Normal range of motion.   Skin:     General: Skin is warm and dry.   Neurological:      General: No focal deficit present.      Mental Status: She is alert and oriented to person, place, and time. Mental status is at baseline.   Psychiatric:         Mood and Affect: Mood normal.         Behavior: Behavior normal.         Thought Content: Thought content normal.         Judgment: Judgment normal.         Vital Signs     Vitals:    08/06/24 1053   BP: 140/78   BP Location: Left arm   Patient Position: Sitting   Cuff Size: Standard   Pulse: 82   SpO2: 97%   Weight: 109 kg (240 lb)   Height: 5' 7\" (1.702 m)       Current Medications       Current Outpatient Medications:     ARIPiprazole (ABILIFY) 5 mg tablet, Take 5 mg by mouth daily, Disp: , Rfl:     Cholecalciferol 2000 units TABS, Take 2,000 Units by mouth, Disp: , Rfl:     doxycycline (PERIOSTAT) 20 MG tablet, ONE PILL TWICE DAILY WITH FOOD BY MOUTH, Disp: , Rfl:     DULoxetine (CYMBALTA) 60 mg delayed release capsule, 60 mg daily, Disp: , Rfl:     Ferrous Sulfate (IRON PO), Take 65 mg by mouth, Disp: , Rfl:     levonorgestrel (KYLEENA) 19.5 MG intrauterine device, 1 each by Intrauterine Device route Once every 5 years, Disp: , Rfl:     metroNIDAZOLE (METROCREAM) 0.75 % cream, APPLY TWICE A DAY TO FACE, Disp: , Rfl:     Multiple Vitamins-Minerals (CENTRUM SILVER PO), Take 1 tablet by mouth daily, Disp: , Rfl:     nystatin " "(MYCOSTATIN) powder, Apply topically 2 (two) times a day, Disp: , Rfl:     OXcarbazepine (TRILEPTAL) 150 mg tablet, Take 150 mg by mouth 2 (two) times a day, Disp: , Rfl:     pravastatin (PRAVACHOL) 40 mg tablet, Take 40 mg by mouth every evening, Disp: , Rfl:     oxybutynin (DITROPAN) 5 mg tablet, Take 1 tablet (5 mg total) by mouth 3 (three) times a day as needed (bladder spasms) for up to 10 days, Disp: 30 tablet, Rfl: 0    Active Problems     Patient Active Problem List   Diagnosis    Overdose    Guillermo-Danlos disease    Hyperlipidemia    Prediabetes    Depression, major, single episode, severe (HCC)    Anxiety disorder    Cellulitis of left lower extremity    Toe infection    History of tibial fracture    Obesity, Class II, BMI 35-39.9    Sleep apnea    Cervical myelopathy (HCC)    Nephrolithiasis    UPJ obstruction, acquired       Past Medical History     Past Medical History:   Diagnosis Date    Arthritis     arms    Bilateral foot-drop     Blood in urine     Chronic pain disorder     \"neuropathy\"    Depression     Guillermo-Danlos syndrome     \"connective tissue disease\"    Exercises three times per week     leg and arm exercises    Family hx of colon cancer     Foot drop, bilateral     bilat leg braces to walk--uses Scooter if out of home    History of fall     HL (hearing loss)     wears hearing aids--bilat --able to hear Left    Hyperlipidemia     Hypertension     per pt does not have this-not on meds    Irritable bowel syndrome     in the past    IUD (intrauterine device) in place     \"to prevent cancer in uterus\"    Kidney stone     Neuropathy     Obesity     Peripheral neuropathy     severe    Sleep apnea 12/19/2023    no CPAP    Spinal stenosis     had surgery    Suicide attempt (HCC)     \"back in 2017\"---had seen a therapist -not currently\"    Urinary incontinence     uses adult depends    Wears glasses     Wheelchair dependence        Surgical History     Past Surgical History:   Procedure Laterality " Date    BACK SURGERY       SECTION      x 2    COLONOSCOPY      Normal with pour prep by Dr. Gramajo    FL LUMBAR PUNCTURE DIAGNOSTIC  2017    FL LUMBAR PUNCTURE DIAGNOSTIC  2017    FL RETROGRADE PYELOGRAM  2024    FRACTURE SURGERY Left     tibial --metal danielle implanted    INSERTION OF INTRAUTERINE DEVICE (IUD)      OR COLONOSCOPY FLX DX W/COLLJ SPEC WHEN PFRMD N/A 2019    Normal by Dr. Felix HALEY CYSTO/URETERO W/LITHOTRIPSY &INDWELL STENT INSRT Right 2024    Procedure: CYSTOSCOPY URETEROSCOPY WITH LITHOTRIPSY HOLMIUM LASER, RETROGRADE PYELOGRAM AND INSERTION STENT URETERAL;  Surgeon: Maycol Gavin MD;  Location: [facility];  Service: Urology    X-STOP IMPLANTATION      2017--pt not sure of this       Family History     Family History   Problem Relation Age of Onset    Stroke Mother     Prostate cancer Father     No Known Problems Sister     Colon cancer Brother     Colon polyps Brother     No Known Problems Maternal Grandmother     No Known Problems Maternal Grandfather     No Known Problems Paternal Grandmother     No Known Problems Paternal Grandfather     No Known Problems Daughter     Breast cancer Maternal Aunt 70    No Known Problems Paternal Aunt        Social History     Social History     Social History     Tobacco Use   Smoking Status Never   Smokeless Tobacco Never       Past Surgical History:   Procedure Laterality Date    BACK SURGERY       SECTION      x 2    COLONOSCOPY      Normal with pour prep by Dr. Gramajo    FL LUMBAR PUNCTURE DIAGNOSTIC  2017    FL LUMBAR PUNCTURE DIAGNOSTIC  2017    FL RETROGRADE PYELOGRAM  2024    FRACTURE SURGERY Left     tibial --metal danielle implanted    INSERTION OF INTRAUTERINE DEVICE (IUD)      OR COLONOSCOPY FLX DX W/COLLJ SPEC WHEN PFRMD N/A 2019    Normal by Dr. Felix HALEY CYSTO/URETERO W/LITHOTRIPSY &INDWELL STENT INSRT Right 2024    Procedure: CYSTOSCOPY URETEROSCOPY WITH LITHOTRIPSY  HOLMIUM LASER, RETROGRADE PYELOGRAM AND INSERTION STENT URETERAL;  Surgeon: Maycol Gavin MD;  Location: [facility];  Service: Urology    X-STOP IMPLANTATION      2017--pt not sure of this         The following portions of the patient's history were reviewed and updated as appropriate: allergies, current medications, past family history, past medical history, past social history, past surgical history and problem list    Please note :  Voice dictation software has been used to create this document.  There may be inadvertent transcription errors.    TERESA Benoit

## 2024-08-06 NOTE — ASSESSMENT & PLAN NOTE
CT from 7/22/2024 continues with mild right hydronephrosis, if she becomes symptomatic can consider workup with MAG3 Lasix renal scan but if remains asymptomatic can continue with observation as she probably has significantly reduced function of the right kidney based on the atrophic appearance.  Dr. Gavin did also personally review these images and does not believe that there is a strong role for pyeloplasty surgery at this time.

## 2024-08-12 ENCOUNTER — TELEPHONE (OUTPATIENT)
Age: 64
End: 2024-08-12

## 2024-08-12 NOTE — TELEPHONE ENCOUNTER
Patient calling to inform that she tested positive for COVID on 8/9/24 and is wondering if her colonoscopy needs to be rescheduled. Review of chart shows that colonoscopy is scheduled with . Call transferred to 's office.

## 2024-08-20 ENCOUNTER — TELEPHONE (OUTPATIENT)
Dept: BARIATRICS | Facility: CLINIC | Age: 64
End: 2024-08-20

## 2024-12-19 ENCOUNTER — ANESTHESIA (OUTPATIENT)
Dept: GASTROENTEROLOGY | Facility: HOSPITAL | Age: 64
End: 2024-12-19
Payer: COMMERCIAL

## 2024-12-19 ENCOUNTER — HOSPITAL ENCOUNTER (OUTPATIENT)
Dept: GASTROENTEROLOGY | Facility: HOSPITAL | Age: 64
Setting detail: OUTPATIENT SURGERY
End: 2024-12-19
Attending: INTERNAL MEDICINE
Payer: COMMERCIAL

## 2024-12-19 ENCOUNTER — ANESTHESIA EVENT (OUTPATIENT)
Dept: GASTROENTEROLOGY | Facility: HOSPITAL | Age: 64
End: 2024-12-19
Payer: COMMERCIAL

## 2024-12-19 VITALS
OXYGEN SATURATION: 97 % | RESPIRATION RATE: 16 BRPM | HEART RATE: 97 BPM | SYSTOLIC BLOOD PRESSURE: 119 MMHG | TEMPERATURE: 97.5 F | DIASTOLIC BLOOD PRESSURE: 55 MMHG

## 2024-12-19 DIAGNOSIS — Z12.11 SCREENING FOR COLON CANCER: ICD-10-CM

## 2024-12-19 DIAGNOSIS — Z80.0 FAMILY HISTORY OF COLON CANCER: ICD-10-CM

## 2024-12-19 DIAGNOSIS — Z86.0100 HISTORY OF COLONIC POLYPS: ICD-10-CM

## 2024-12-19 RX ORDER — PROPOFOL 10 MG/ML
INJECTION, EMULSION INTRAVENOUS AS NEEDED
Status: DISCONTINUED | OUTPATIENT
Start: 2024-12-19 | End: 2024-12-19

## 2024-12-19 RX ORDER — PROPOFOL 10 MG/ML
INJECTION, EMULSION INTRAVENOUS CONTINUOUS PRN
Status: DISCONTINUED | OUTPATIENT
Start: 2024-12-19 | End: 2024-12-19

## 2024-12-19 RX ORDER — SODIUM CHLORIDE, SODIUM LACTATE, POTASSIUM CHLORIDE, CALCIUM CHLORIDE 600; 310; 30; 20 MG/100ML; MG/100ML; MG/100ML; MG/100ML
100 INJECTION, SOLUTION INTRAVENOUS CONTINUOUS
Status: CANCELLED | OUTPATIENT
Start: 2024-12-19

## 2024-12-19 RX ORDER — SODIUM CHLORIDE, SODIUM LACTATE, POTASSIUM CHLORIDE, CALCIUM CHLORIDE 600; 310; 30; 20 MG/100ML; MG/100ML; MG/100ML; MG/100ML
100 INJECTION, SOLUTION INTRAVENOUS CONTINUOUS
Status: DISCONTINUED | OUTPATIENT
Start: 2024-12-19 | End: 2024-12-23 | Stop reason: HOSPADM

## 2024-12-19 RX ADMIN — PROPOFOL 100 MCG/KG/MIN: 10 INJECTION, EMULSION INTRAVENOUS at 09:56

## 2024-12-19 RX ADMIN — SODIUM CHLORIDE, SODIUM LACTATE, POTASSIUM CHLORIDE, AND CALCIUM CHLORIDE 100 ML/HR: .6; .31; .03; .02 INJECTION, SOLUTION INTRAVENOUS at 09:47

## 2024-12-19 RX ADMIN — PROPOFOL 100 MG: 10 INJECTION, EMULSION INTRAVENOUS at 09:54

## 2024-12-19 NOTE — INTERVAL H&P NOTE
H&P reviewed. After examining the patient I find no changes in the patients condition since the H&P had been written.    Vitals:    12/19/24 0930   BP: 149/72   Pulse: 88   Resp: 16   Temp: 99.5 °F (37.5 °C)   SpO2: 99%

## 2024-12-19 NOTE — ANESTHESIA PREPROCEDURE EVALUATION
Procedure:  COLONOSCOPY    Relevant Problems   ANESTHESIA (within normal limits)      CARDIO   (+) Hyperlipidemia      /RENAL   (+) Nephrolithiasis   (+) UPJ obstruction, acquired      NEURO/PSYCH   (+) Anxiety disorder   (+) Depression, major, single episode, severe (HCC)      PULMONARY   (+) Sleep apnea        Physical Exam    Airway    Mallampati score: II  TM Distance: >3 FB  Neck ROM: full     Dental       Cardiovascular  Rate: normal    Pulmonary  Pulmonary exam normal     Other Findings  Per pt denies anything remaining that is loose or removeablepost-pubertal.      Anesthesia Plan  ASA Score- 3     Anesthesia Type- IV sedation with anesthesia with ASA Monitors.         Additional Monitors:     Airway Plan:            Plan Factors-Exercise tolerance (METS): >4 METS.    Chart reviewed.    Patient summary reviewed.    Patient is not a current smoker.              Induction- intravenous.    Postoperative Plan-         Informed Consent- Anesthetic plan and risks discussed with patient.  I personally reviewed this patient with the CRNA. Discussed and agreed on the Anesthesia Plan with the CRNA..

## 2024-12-19 NOTE — ANESTHESIA POSTPROCEDURE EVALUATION
Post-Op Assessment Note    CV Status:  Stable  Pain Score: 0    Pain management: adequate       Mental Status:  Awake   Hydration Status:  Stable   PONV Controlled:  None   Airway Patency:  Patent     Post Op Vitals Reviewed: Yes    No anethesia notable event occurred.    Staff: CRNA           Last Filed PACU Vitals:  Vitals Value Taken Time   Temp 98 °F (36.7 °C) 12/19/24 1002   Pulse 82 12/19/24 1002   /52 12/19/24 1002   Resp 18 12/19/24 1002   SpO2 98 % 12/19/24 1002       Modified Tavon:  Activity: 2 (12/19/2024 10:02 AM)  Respiration: 2 (12/19/2024 10:02 AM)  Circulation: 2 (12/19/2024 10:02 AM)  Consciousness: 1 (12/19/2024 10:02 AM)  Oxygen Saturation: 2 (12/19/2024 10:02 AM)  Modified Tavon Score: 9 (12/19/2024 10:02 AM)

## 2024-12-19 NOTE — DISCHARGE INSTR - AVS FIRST PAGE
Please call [phone] with any problems.  Your preparation was not satisfactory.  My office will contact you for repeat testing

## 2025-02-26 RX ORDER — SODIUM CHLORIDE, SODIUM LACTATE, POTASSIUM CHLORIDE, CALCIUM CHLORIDE 600; 310; 30; 20 MG/100ML; MG/100ML; MG/100ML; MG/100ML
125 INJECTION, SOLUTION INTRAVENOUS CONTINUOUS
Status: CANCELLED | OUTPATIENT
Start: 2025-02-26

## 2025-02-27 ENCOUNTER — ANESTHESIA (OUTPATIENT)
Dept: GASTROENTEROLOGY | Facility: HOSPITAL | Age: 65
End: 2025-02-27
Payer: COMMERCIAL

## 2025-02-27 ENCOUNTER — HOSPITAL ENCOUNTER (OUTPATIENT)
Dept: GASTROENTEROLOGY | Facility: HOSPITAL | Age: 65
Setting detail: OUTPATIENT SURGERY
Discharge: HOME | End: 2025-02-27
Attending: INTERNAL MEDICINE
Payer: COMMERCIAL

## 2025-02-27 ENCOUNTER — ANESTHESIA EVENT (OUTPATIENT)
Dept: GASTROENTEROLOGY | Facility: HOSPITAL | Age: 65
End: 2025-02-27
Payer: COMMERCIAL

## 2025-02-27 VITALS
HEART RATE: 88 BPM | TEMPERATURE: 96.8 F | WEIGHT: 240 LBS | OXYGEN SATURATION: 97 % | BODY MASS INDEX: 37.67 KG/M2 | RESPIRATION RATE: 20 BRPM | HEIGHT: 67 IN | DIASTOLIC BLOOD PRESSURE: 76 MMHG | SYSTOLIC BLOOD PRESSURE: 150 MMHG

## 2025-02-27 DIAGNOSIS — Z80.0 FAMILY HISTORY OF COLON CANCER: ICD-10-CM

## 2025-02-27 DIAGNOSIS — Z12.11 SCREENING FOR COLON CANCER: ICD-10-CM

## 2025-02-27 DIAGNOSIS — Z86.0100 PERSONAL HISTORY OF COLON POLYPS, UNSPECIFIED: ICD-10-CM

## 2025-02-27 PROBLEM — K59.09 OTHER CONSTIPATION: Status: ACTIVE | Noted: 2023-08-24

## 2025-02-27 PROBLEM — N85.02 EIN (ENDOMETRIAL INTRAEPITHELIAL NEOPLASIA): Status: ACTIVE | Noted: 2020-05-15

## 2025-02-27 PROBLEM — E66.01 CLASS 2 SEVERE OBESITY DUE TO EXCESS CALORIES WITH SERIOUS COMORBIDITY AND BODY MASS INDEX (BMI) OF 37.0 TO 37.9 IN ADULT (HCC): Status: ACTIVE | Noted: 2020-07-13

## 2025-02-27 PROBLEM — M79.2 NEUROPATHIC PAIN: Status: ACTIVE | Noted: 2017-03-06

## 2025-02-27 PROBLEM — N13.2 URETERAL STONE WITH HYDRONEPHROSIS: Status: ACTIVE | Noted: 2017-05-24

## 2025-02-27 PROBLEM — R63.5 ABNORMAL WEIGHT GAIN: Status: ACTIVE | Noted: 2021-05-07

## 2025-02-27 PROBLEM — N85.01 SIMPLE ENDOMETRIAL HYPERPLASIA: Status: ACTIVE | Noted: 2019-12-04

## 2025-02-27 PROBLEM — F33.1 MODERATE EPISODE OF RECURRENT MAJOR DEPRESSIVE DISORDER (HCC): Status: ACTIVE | Noted: 2021-03-30

## 2025-02-27 PROBLEM — N39.46 MIXED STRESS AND URGE URINARY INCONTINENCE: Status: ACTIVE | Noted: 2020-06-29

## 2025-02-27 PROBLEM — F32.A CHRONIC DEPRESSION: Status: ACTIVE | Noted: 2021-08-03

## 2025-02-27 PROBLEM — R26.2 AMBULATORY DYSFUNCTION: Status: ACTIVE | Noted: 2021-05-07

## 2025-02-27 PROBLEM — E66.812 CLASS 2 SEVERE OBESITY DUE TO EXCESS CALORIES WITH SERIOUS COMORBIDITY AND BODY MASS INDEX (BMI) OF 37.0 TO 37.9 IN ADULT (HCC): Status: ACTIVE | Noted: 2020-07-13

## 2025-02-27 RX ORDER — LIDOCAINE HYDROCHLORIDE 10 MG/ML
INJECTION, SOLUTION EPIDURAL; INFILTRATION; INTRACAUDAL; PERINEURAL AS NEEDED
Status: DISCONTINUED | OUTPATIENT
Start: 2025-02-27 | End: 2025-02-27

## 2025-02-27 RX ORDER — SODIUM CHLORIDE, SODIUM LACTATE, POTASSIUM CHLORIDE, CALCIUM CHLORIDE 600; 310; 30; 20 MG/100ML; MG/100ML; MG/100ML; MG/100ML
INJECTION, SOLUTION INTRAVENOUS CONTINUOUS PRN
Status: DISCONTINUED | OUTPATIENT
Start: 2025-02-27 | End: 2025-02-27

## 2025-02-27 RX ORDER — SODIUM CHLORIDE, SODIUM LACTATE, POTASSIUM CHLORIDE, CALCIUM CHLORIDE 600; 310; 30; 20 MG/100ML; MG/100ML; MG/100ML; MG/100ML
125 INJECTION, SOLUTION INTRAVENOUS CONTINUOUS
Status: DISCONTINUED | OUTPATIENT
Start: 2025-02-27 | End: 2025-03-03 | Stop reason: HOSPADM

## 2025-02-27 RX ORDER — SODIUM CHLORIDE, SODIUM LACTATE, POTASSIUM CHLORIDE, CALCIUM CHLORIDE 600; 310; 30; 20 MG/100ML; MG/100ML; MG/100ML; MG/100ML
100 INJECTION, SOLUTION INTRAVENOUS CONTINUOUS
Status: CANCELLED | OUTPATIENT
Start: 2025-02-27

## 2025-02-27 RX ORDER — PROPOFOL 10 MG/ML
INJECTION, EMULSION INTRAVENOUS AS NEEDED
Status: DISCONTINUED | OUTPATIENT
Start: 2025-02-27 | End: 2025-02-27

## 2025-02-27 RX ORDER — PROPOFOL 10 MG/ML
INJECTION, EMULSION INTRAVENOUS CONTINUOUS PRN
Status: DISCONTINUED | OUTPATIENT
Start: 2025-02-27 | End: 2025-02-27

## 2025-02-27 RX ADMIN — PROPOFOL 100 MG: 10 INJECTION, EMULSION INTRAVENOUS at 09:32

## 2025-02-27 RX ADMIN — LIDOCAINE HYDROCHLORIDE 50 MG: 10 SOLUTION INTRAVENOUS at 09:32

## 2025-02-27 RX ADMIN — SODIUM CHLORIDE, SODIUM LACTATE, POTASSIUM CHLORIDE, AND CALCIUM CHLORIDE: .6; .31; .03; .02 INJECTION, SOLUTION INTRAVENOUS at 09:15

## 2025-02-27 RX ADMIN — PROPOFOL 100 MCG/KG/MIN: 10 INJECTION, EMULSION INTRAVENOUS at 09:35

## 2025-02-27 NOTE — ANESTHESIA POSTPROCEDURE EVALUATION
Post-Op Assessment Note    CV Status:  Stable  Pain Score: 0    Pain management: adequate       Mental Status:  Alert and awake   Hydration Status:  Stable   PONV Controlled:  None   Airway Patency:  Patent     Post Op Vitals Reviewed: Yes    No anethesia notable event occurred.    Staff: CRNA       Last Filed PACU Vitals:  Vitals Value Taken Time   Temp     Pulse     BP     Resp     SpO2

## 2025-02-27 NOTE — DISCHARGE INSTR - AVS FIRST PAGE
Please call [phone] with any problems.  The preparation again was suboptimal although about 80% of the colon was seen.  The right colon was not seen however.  There was a small polyp that I could not revisualize it was not removed and appears benign.  Will discuss various options back in the office

## 2025-02-27 NOTE — ANESTHESIA PREPROCEDURE EVALUATION
Procedure:  COLONOSCOPY    Relevant Problems   CARDIO   (+) Hyperlipidemia   (+) Other hyperlipidemia      /RENAL   (+) Nephrolithiasis   (+) UPJ obstruction, acquired   (+) Ureteral stone with hydronephrosis      HEMATOLOGY   (+) Iron deficiency anemia      NEURO/PSYCH   (+) Anxiety disorder   (+) Chronic depression   (+) Depression   (+) Depression, major, single episode, severe (HCC)   (+) Moderate episode of recurrent major depressive disorder (HCC)   (+) Recurrent major depressive disorder, in full remission (HCC)   (+) Spinal cord compression (HCC)      PULMONARY   (+) Severe obstructive sleep apnea   (+) Sleep apnea        Physical Exam    Airway    Mallampati score: III  TM Distance: <3 FB  Neck ROM: full     Dental   No notable dental hx     Cardiovascular  Cardiovascular exam normal    Pulmonary  Pulmonary exam normal     Other Findings  post-pubertal.    Anesthesia Plan  ASA Score- 3     Anesthesia Type- IV sedation with anesthesia with ASA Monitors.         Additional Monitors:     Airway Plan:            Plan Factors-Exercise tolerance (METS): <4 METS.    Chart reviewed.   Existing labs reviewed.     Patient is not a current smoker. Patient not instructed to abstain from smoking on day of procedure. Patient did not smoke on day of surgery.            Induction- intravenous.    Postoperative Plan-         Informed Consent- Anesthetic plan and risks discussed with patient.  I personally reviewed this patient with the CRNA. Discussed and agreed on the Anesthesia Plan with the CRNA..      NPO Status:  No vitals data found for the desired time range.

## 2025-02-27 NOTE — H&P
See recent H&P.  Patient does report some slight bright red blood per rectum sometimes with bowel movement.  Reports prep went much better

## 2025-02-27 NOTE — INTERVAL H&P NOTE
H&P reviewed. After examining the patient I find no changes in the patients condition since the H&P had been written.    Vitals:    02/27/25 0908   BP: 142/67   Resp: 18   Temp: (!) 97.2 °F (36.2 °C)

## 2025-04-18 ENCOUNTER — HOSPITAL ENCOUNTER (OUTPATIENT)
Dept: MAMMOGRAPHY | Facility: MEDICAL CENTER | Age: 65
Discharge: HOME | End: 2025-04-18
Payer: COMMERCIAL

## 2025-04-18 VITALS — HEIGHT: 67 IN | BODY MASS INDEX: 37.67 KG/M2 | WEIGHT: 240 LBS

## 2025-04-18 DIAGNOSIS — Z12.31 ENCOUNTER FOR SCREENING MAMMOGRAM FOR MALIGNANT NEOPLASM OF BREAST: ICD-10-CM

## 2025-04-18 PROCEDURE — 77067 SCR MAMMO BI INCL CAD: CPT

## 2025-04-18 PROCEDURE — 77063 BREAST TOMOSYNTHESIS BI: CPT

## (undated) DEVICE — INVIEW CLEAR LEGGINGS: Brand: CONVERTORS

## (undated) DEVICE — FIBER STD QUANTA 272 MICRON

## (undated) DEVICE — PACK TUR

## (undated) DEVICE — ADAPTOR SYRINGE LL ONGUARD

## (undated) DEVICE — SHEATH URETERAL ACCESS 10/12FR 35CM PROXIS

## (undated) DEVICE — SINGLE-USE DIGITAL FLEXIBLE URETEROSCOPE: Brand: APTRA

## (undated) DEVICE — STERILE LUBRICATING JELLY, TUBE: Brand: HR LUBRICATING JELLY

## (undated) DEVICE — ADAPTER URINARY CATH SIMPLIVIA ONGUARD 2

## (undated) DEVICE — CATH URETERAL 5FR X 70 CM FLEX TIP POLYUR BARD

## (undated) DEVICE — PREMIUM DRY TRAY LF: Brand: MEDLINE INDUSTRIES, INC.

## (undated) DEVICE — CHLORHEXIDINE 4PCT 4 OZ

## (undated) DEVICE — UROLOGIC DRAIN BAG: Brand: UNBRANDED

## (undated) DEVICE — GLOVE SRG BIOGEL 7.5

## (undated) DEVICE — GUIDEWIRE STRGHT TIP 0.035 IN  SOLO PLUS

## (undated) DEVICE — SPECIMEN CONTAINER STERILE PEEL PACK